# Patient Record
Sex: FEMALE | Race: WHITE | NOT HISPANIC OR LATINO | Employment: FULL TIME | URBAN - METROPOLITAN AREA
[De-identification: names, ages, dates, MRNs, and addresses within clinical notes are randomized per-mention and may not be internally consistent; named-entity substitution may affect disease eponyms.]

---

## 2022-03-11 ENCOUNTER — HISTORICAL (OUTPATIENT)
Dept: ADMINISTRATIVE | Facility: HOSPITAL | Age: 32
End: 2022-03-11

## 2022-04-10 ENCOUNTER — HISTORICAL (OUTPATIENT)
Dept: ADMINISTRATIVE | Facility: HOSPITAL | Age: 32
End: 2022-04-10

## 2022-04-26 VITALS
BODY MASS INDEX: 20.61 KG/M2 | WEIGHT: 112 LBS | SYSTOLIC BLOOD PRESSURE: 120 MMHG | HEIGHT: 62 IN | OXYGEN SATURATION: 98 % | DIASTOLIC BLOOD PRESSURE: 80 MMHG

## 2022-05-14 NOTE — OP NOTE
DATE OF SURGERY:    03/11/2022    SURGEON:  Luc Ramon MD    PREOPERATIVE DIAGNOSIS:  Rectal bleeding.    POSTOPERATIVE DIAGNOSES:    1. Mild proctitis.  2. Hemorrhoids.    PROCEDURE:  Colonoscopy to the cecum.    ANESTHESIA:  Propofol.    ESTIMATED BLOOD LOSS:  Less than 10 cc.    SPECIMENS:  None.    COMPLICATIONS:  None.    PROCEDURE IN DETAIL:  After informed consent was obtained, patient was brought to the operating room.  Continuous EKG, pulse oximetry, and intermittent blood pressure monitoring were utilized.  The patient was placed in the left lateral decubitus position and propofol was administered by a member of the anesthesia team.  A perianal examination was unremarkable.  Digital rectal exam was normal.       Colonoscope was inserted into the rectum and insufflated.  Mild proctitis was identified involving the lower rectum.  Colonoscope was then advanced in a retrograde fashion to the level of the cecum.  The colon was quite tortuous.  The ileocecal valve and appendiceal orifice were identified.  Photos were taken.  The terminal ileum was intubated and normal.  A photo was taken.  Colonoscope was retracted back into the cecum, and the colonic mucosa was examined in a 360-degree fashion.  No masses, polyps, or other mucosal abnormalities were noted.  Colonoscope was retracted back into the rectum while decompressing the colon.  It was retroflexed, and mild internal hemorrhoids were seen.  A photo was taken.  Colonoscope was returned to the neutral position and completely removed from the patient.  She tolerated procedure well, and there were no complications.  Subsequently, she was transferred to recovery in satisfactory condition.      PLAN:  Recommend resuming screening at age 46.        ______________________________  Luc Ramon MD    /AMANDO  DD:  03/11/2022  Time:  05:12PM  DT:  03/11/2022  Time:  08:57PM  Job #:  [id number]

## 2022-06-01 PROBLEM — F98.8 ATTENTION DEFICIT DISORDER: Status: ACTIVE | Noted: 2022-06-01

## 2023-01-06 ENCOUNTER — LAB VISIT (OUTPATIENT)
Dept: LAB | Facility: HOSPITAL | Age: 33
End: 2023-01-06
Attending: STUDENT IN AN ORGANIZED HEALTH CARE EDUCATION/TRAINING PROGRAM
Payer: COMMERCIAL

## 2023-01-06 DIAGNOSIS — R15.2 FECAL URGENCY: ICD-10-CM

## 2023-01-06 DIAGNOSIS — R19.8 TENESMUS (RECTAL): ICD-10-CM

## 2023-01-06 DIAGNOSIS — K62.5 RECTAL BLEEDING: Primary | ICD-10-CM

## 2023-01-06 LAB
ANION GAP SERPL CALC-SCNC: 7 MEQ/L
BASOPHILS # BLD AUTO: 0.05 X10(3)/MCL (ref 0–0.2)
BASOPHILS NFR BLD AUTO: 0.8 %
BUN SERPL-MCNC: 9.5 MG/DL (ref 7–18.7)
CALCIUM SERPL-MCNC: 9.8 MG/DL (ref 8.4–10.2)
CHLORIDE SERPL-SCNC: 106 MMOL/L (ref 98–107)
CO2 SERPL-SCNC: 27 MMOL/L (ref 22–29)
CREAT SERPL-MCNC: 0.95 MG/DL (ref 0.55–1.02)
CREAT/UREA NIT SERPL: 10
CRP SERPL-MCNC: 0.6 MG/L
DEPRECATED CALCIDIOL+CALCIFEROL SERPL-MC: 44.7 NG/ML (ref 30–80)
EOSINOPHIL # BLD AUTO: 0.12 X10(3)/MCL (ref 0–0.9)
EOSINOPHIL NFR BLD AUTO: 1.9 %
ERYTHROCYTE [DISTWIDTH] IN BLOOD BY AUTOMATED COUNT: 12.8 % (ref 11–14.5)
FERRITIN SERPL-MCNC: 63.62 NG/ML (ref 4.63–204)
GFR SERPLBLD CREATININE-BSD FMLA CKD-EPI: 82 MLS/MIN/1.73/M2
GLUCOSE SERPL-MCNC: 80 MG/DL (ref 74–100)
HCT VFR BLD AUTO: 40 % (ref 37–47)
HGB BLD-MCNC: 13.5 GM/DL (ref 12–16)
IMM GRANULOCYTES # BLD AUTO: 0.01 X10(3)/MCL (ref 0–0.04)
IMM GRANULOCYTES NFR BLD AUTO: 0.2 %
IRON SATN MFR SERPL: 44 % (ref 20–50)
IRON SERPL-MCNC: 136 UG/DL (ref 50–170)
LYMPHOCYTES # BLD AUTO: 1.51 X10(3)/MCL (ref 0.6–4.6)
LYMPHOCYTES NFR BLD AUTO: 23.6 %
MCH RBC QN AUTO: 30.8 PG
MCHC RBC AUTO-ENTMCNC: 33.8 MG/DL (ref 33–36)
MCV RBC AUTO: 91.1 FL (ref 80–94)
MONOCYTES # BLD AUTO: 0.56 X10(3)/MCL (ref 0.1–1.3)
MONOCYTES NFR BLD AUTO: 8.8 %
NEUTROPHILS # BLD AUTO: 4.14 X10(3)/MCL (ref 2.1–9.2)
NEUTROPHILS NFR BLD AUTO: 64.7 %
NRBC BLD AUTO-RTO: 0 % (ref 0–1)
PLATELET # BLD AUTO: 276 X10(3)/MCL (ref 140–371)
PMV BLD AUTO: 10.8 FL (ref 9.4–12.4)
POTASSIUM SERPL-SCNC: 4.4 MMOL/L (ref 3.5–5.1)
RBC # BLD AUTO: 4.39 X10(6)/MCL (ref 4.2–5.4)
SODIUM SERPL-SCNC: 140 MMOL/L (ref 136–145)
TIBC SERPL-MCNC: 175 UG/DL (ref 70–310)
TIBC SERPL-MCNC: 311 UG/DL (ref 250–450)
WBC # SPEC AUTO: 6.4 X10(3)/MCL (ref 4.5–11.5)

## 2023-01-06 PROCEDURE — 82728 ASSAY OF FERRITIN: CPT

## 2023-01-06 PROCEDURE — 85025 COMPLETE CBC W/AUTO DIFF WBC: CPT

## 2023-01-06 PROCEDURE — 80048 BASIC METABOLIC PNL TOTAL CA: CPT

## 2023-01-06 PROCEDURE — 82306 VITAMIN D 25 HYDROXY: CPT

## 2023-01-06 PROCEDURE — 36415 COLL VENOUS BLD VENIPUNCTURE: CPT

## 2023-01-06 PROCEDURE — 86140 C-REACTIVE PROTEIN: CPT

## 2023-01-06 PROCEDURE — 83550 IRON BINDING TEST: CPT

## 2023-05-18 ENCOUNTER — LAB VISIT (OUTPATIENT)
Dept: LAB | Facility: HOSPITAL | Age: 33
End: 2023-05-18
Attending: OBSTETRICS & GYNECOLOGY
Payer: COMMERCIAL

## 2023-05-18 DIAGNOSIS — E28.2 POLYCYSTIC OVARIES: Primary | ICD-10-CM

## 2023-05-18 LAB
FSH SERPL-ACNC: 3.01 MIU/ML
PROLACTIN LEVEL (OHS): 5.93 NG/ML (ref 5.18–26.53)
TSH SERPL-ACNC: 1.87 UIU/ML (ref 0.35–4.94)

## 2023-05-18 PROCEDURE — 36415 COLL VENOUS BLD VENIPUNCTURE: CPT

## 2023-05-18 PROCEDURE — 84443 ASSAY THYROID STIM HORMONE: CPT

## 2023-05-18 PROCEDURE — 84146 ASSAY OF PROLACTIN: CPT

## 2023-05-18 PROCEDURE — 83001 ASSAY OF GONADOTROPIN (FSH): CPT

## 2023-06-07 ENCOUNTER — LAB VISIT (OUTPATIENT)
Dept: LAB | Facility: HOSPITAL | Age: 33
End: 2023-06-07
Attending: STUDENT IN AN ORGANIZED HEALTH CARE EDUCATION/TRAINING PROGRAM
Payer: COMMERCIAL

## 2023-06-07 DIAGNOSIS — R19.8 TENESMUS: ICD-10-CM

## 2023-06-07 DIAGNOSIS — E28.2 POLYCYSTIC OVARIES: ICD-10-CM

## 2023-06-07 DIAGNOSIS — K62.5 HEMORRHAGE OF RECTUM AND ANUS: ICD-10-CM

## 2023-06-07 DIAGNOSIS — K51.20 ULCERATIVE (CHRONIC) PROCTITIS: Primary | ICD-10-CM

## 2023-06-07 LAB — PROGEST SERPL-MCNC: <0.5 NG/ML

## 2023-06-07 PROCEDURE — 84144 ASSAY OF PROGESTERONE: CPT

## 2023-06-07 PROCEDURE — 36415 COLL VENOUS BLD VENIPUNCTURE: CPT

## 2023-06-07 PROCEDURE — 83993 ASSAY FOR CALPROTECTIN FECAL: CPT

## 2023-06-09 LAB — CALPROTECTIN STL-MCNT: <50 MCG/G

## 2023-07-21 ENCOUNTER — LAB VISIT (OUTPATIENT)
Dept: LAB | Facility: HOSPITAL | Age: 33
End: 2023-07-21
Attending: OBSTETRICS & GYNECOLOGY
Payer: COMMERCIAL

## 2023-07-21 DIAGNOSIS — E28.2 POLYCYSTIC OVARIES: Primary | ICD-10-CM

## 2023-07-21 LAB — PROGEST SERPL-MCNC: 0.5 NG/ML

## 2023-07-21 PROCEDURE — 36415 COLL VENOUS BLD VENIPUNCTURE: CPT

## 2023-07-21 PROCEDURE — 84144 ASSAY OF PROGESTERONE: CPT

## 2023-09-06 ENCOUNTER — LAB VISIT (OUTPATIENT)
Dept: LAB | Facility: HOSPITAL | Age: 33
End: 2023-09-06
Attending: OBSTETRICS & GYNECOLOGY
Payer: COMMERCIAL

## 2023-09-06 DIAGNOSIS — E34.9 ENDOCRINE DISORDER RELATED TO PUBERTY: Primary | ICD-10-CM

## 2023-09-06 LAB — PROGEST SERPL-MCNC: 2.4 NG/ML

## 2023-09-06 PROCEDURE — 84144 ASSAY OF PROGESTERONE: CPT

## 2023-09-06 PROCEDURE — 36415 COLL VENOUS BLD VENIPUNCTURE: CPT

## 2023-10-02 ENCOUNTER — LAB VISIT (OUTPATIENT)
Dept: LAB | Facility: HOSPITAL | Age: 33
End: 2023-10-02
Attending: OBSTETRICS & GYNECOLOGY
Payer: COMMERCIAL

## 2023-10-02 DIAGNOSIS — E34.9 ENDOCRINE DISORDER RELATED TO PUBERTY: Primary | ICD-10-CM

## 2023-10-02 LAB — PROGEST SERPL-MCNC: 5.7 NG/ML

## 2023-10-02 PROCEDURE — 84144 ASSAY OF PROGESTERONE: CPT

## 2023-10-02 PROCEDURE — 36415 COLL VENOUS BLD VENIPUNCTURE: CPT

## 2023-11-15 PROBLEM — Z23 IMMUNIZATION DUE: Status: ACTIVE | Noted: 2023-11-15

## 2024-01-12 ENCOUNTER — LAB VISIT (OUTPATIENT)
Dept: LAB | Facility: HOSPITAL | Age: 34
End: 2024-01-12
Attending: STUDENT IN AN ORGANIZED HEALTH CARE EDUCATION/TRAINING PROGRAM
Payer: COMMERCIAL

## 2024-01-12 DIAGNOSIS — K51.20 ULCERATIVE (CHRONIC) PROCTITIS: Primary | ICD-10-CM

## 2024-01-12 LAB
ALBUMIN SERPL-MCNC: 3.7 G/DL (ref 3.5–5)
ALBUMIN/GLOB SERPL: 1.3 RATIO (ref 1.1–2)
ALP SERPL-CCNC: 37 UNIT/L (ref 40–150)
ALT SERPL-CCNC: 13 UNIT/L (ref 0–55)
AST SERPL-CCNC: 18 UNIT/L (ref 5–34)
BASOPHILS # BLD AUTO: 0.03 X10(3)/MCL
BASOPHILS NFR BLD AUTO: 0.6 %
BILIRUB SERPL-MCNC: 0.3 MG/DL
BUN SERPL-MCNC: 10.5 MG/DL (ref 7–18.7)
CALCIUM SERPL-MCNC: 9.4 MG/DL (ref 8.4–10.2)
CHLORIDE SERPL-SCNC: 107 MMOL/L (ref 98–107)
CO2 SERPL-SCNC: 26 MMOL/L (ref 22–29)
CREAT SERPL-MCNC: 0.75 MG/DL (ref 0.55–1.02)
EOSINOPHIL # BLD AUTO: 0.07 X10(3)/MCL (ref 0–0.9)
EOSINOPHIL NFR BLD AUTO: 1.4 %
ERYTHROCYTE [DISTWIDTH] IN BLOOD BY AUTOMATED COUNT: 12.8 % (ref 11.5–17)
GFR SERPLBLD CREATININE-BSD FMLA CKD-EPI: >60 MLS/MIN/1.73/M2
GLOBULIN SER-MCNC: 2.8 GM/DL (ref 2.4–3.5)
GLUCOSE SERPL-MCNC: 62 MG/DL (ref 74–100)
HCT VFR BLD AUTO: 38.1 % (ref 37–47)
HGB BLD-MCNC: 12.8 G/DL (ref 12–16)
IMM GRANULOCYTES # BLD AUTO: 0.01 X10(3)/MCL (ref 0–0.04)
IMM GRANULOCYTES NFR BLD AUTO: 0.2 %
LYMPHOCYTES # BLD AUTO: 1.57 X10(3)/MCL (ref 0.6–4.6)
LYMPHOCYTES NFR BLD AUTO: 31.1 %
MCH RBC QN AUTO: 31.1 PG (ref 27–31)
MCHC RBC AUTO-ENTMCNC: 33.6 G/DL (ref 33–36)
MCV RBC AUTO: 92.5 FL (ref 80–94)
MONOCYTES # BLD AUTO: 0.36 X10(3)/MCL (ref 0.1–1.3)
MONOCYTES NFR BLD AUTO: 7.1 %
NEUTROPHILS # BLD AUTO: 3.01 X10(3)/MCL (ref 2.1–9.2)
NEUTROPHILS NFR BLD AUTO: 59.6 %
NRBC BLD AUTO-RTO: 0 %
PLATELET # BLD AUTO: 236 X10(3)/MCL (ref 130–400)
PMV BLD AUTO: 10.8 FL (ref 7.4–10.4)
POTASSIUM SERPL-SCNC: 4.4 MMOL/L (ref 3.5–5.1)
PROT SERPL-MCNC: 6.5 GM/DL (ref 6.4–8.3)
RBC # BLD AUTO: 4.12 X10(6)/MCL (ref 4.2–5.4)
SODIUM SERPL-SCNC: 139 MMOL/L (ref 136–145)
WBC # SPEC AUTO: 5.05 X10(3)/MCL (ref 4.5–11.5)

## 2024-01-12 PROCEDURE — 36415 COLL VENOUS BLD VENIPUNCTURE: CPT

## 2024-01-12 PROCEDURE — 80053 COMPREHEN METABOLIC PANEL: CPT

## 2024-01-12 PROCEDURE — 85025 COMPLETE CBC W/AUTO DIFF WBC: CPT

## 2024-01-24 ENCOUNTER — LAB REQUISITION (OUTPATIENT)
Dept: LAB | Facility: HOSPITAL | Age: 34
End: 2024-01-24
Payer: COMMERCIAL

## 2024-01-24 DIAGNOSIS — K51.20 ULCERATIVE (CHRONIC) PROCTITIS WITHOUT COMPLICATIONS: ICD-10-CM

## 2024-01-24 PROCEDURE — 83993 ASSAY FOR CALPROTECTIN FECAL: CPT | Performed by: STUDENT IN AN ORGANIZED HEALTH CARE EDUCATION/TRAINING PROGRAM

## 2024-01-26 LAB — CALPROTECTIN STL-MCNT: <50 MCG/G

## 2024-02-20 NOTE — PROGRESS NOTES
.Medication Refill (3 month f/u/No complaints at present )         HPI:    Patient presents for 3 month recheck/refill medications.  Patient states medications are working well; she is able to concentrate, focus and stay on task.  She denies anxiety, insomnia, palpitations, unintentional weight loss, headaches or dry mouth.   reviewed.    Narcotics agreement updated 05/31/2023.      Current Outpatient Medications:     CLOMID 50 mg tablet, PRN, Disp: , Rfl:     mesalamine (LIALDA) 1.2 gram TbEC, Take 2.4 g by mouth., Disp: , Rfl:     metFORMIN (GLUCOPHAGE) 850 MG tablet, Take 850 mg by mouth 2 (two) times daily., Disp: , Rfl:     nifedipine 0.3%-lidocaine 1.5% topical ointment, Apply topically 3 (three) times daily. Apply a pea sized amount three times a day to the anus., Disp: , Rfl:     ondansetron (ZOFRAN) 8 MG tablet, Take 8 mg by mouth every 8 (eight) hours as needed for Nausea., Disp: , Rfl:     prenat.vits,dana,min-iron-folic Tab, Take by mouth Daily., Disp: , Rfl:     progesterone (PROMETRIUM) 200 MG capsule, Take 200 mg by mouth every evening., Disp: , Rfl:     tretinoin (RETIN-A) 0.05 % cream, Apply topically every evening., Disp: , Rfl:     valACYclovir (VALTREX) 500 MG tablet, Take 500 mg by mouth as needed., Disp: , Rfl:     dextroamphetamine-amphetamine (ADDERALL) 20 mg tablet, Take 1 tablet by mouth 2 (two) times a day., Disp: 60 tablet, Rfl: 0    dextroamphetamine-amphetamine (ADDERALL) 20 mg tablet, Take 1 tablet by mouth 2 (two) times a day. Fill: 3/20/2024., Disp: 60 tablet, Rfl: 0    dextroamphetamine-amphetamine (ADDERALL) 20 mg tablet, Take 1 tablet by mouth 2 (two) times a day. Fill: 4/19/2024., Disp: 60 tablet, Rfl: 0      ROS:    See HPI.      PE:    ..  Vitals:    02/21/24 1329   BP: 98/76   Pulse: 96   Resp: 18        General:  She is well-developed well-nourished white female in no apparent distress.  She is alert and oriented.    Chest: Clear to auscultation bilaterally.    CV:  Regular  rate rhythm without murmurs rubs or gallops.        1. Attention deficit disorder (ADD) without hyperactivity  Overview:  Stable/patient doing well on current treatment, will continue to closely monitor, follow-up in 3 months.  3 prescriptions sent to pharm at office visit today for 3-month supply.  Risks/benefits/side effects of medication discussed with patient.  Patient denies any insomnia or palpitations.   Informed patient to stop Adderall immediatly if she misses her period or has +UPT--verbalizes understanding.    05/31/2023: Patient continues to do well on medications; refills x3 given.  Patient is actively trying to become pregnant; see above.  Narcotics agreement updated.    08/22/2023:  Patient is doing well on medications; refills x3 given.    11/15/2023: Patient is doing well on medications; refills x3 given.    02/21/2024:  Patient is doing well on medications; refills x3 given.    Orders:  -     dextroamphetamine-amphetamine (ADDERALL) 20 mg tablet; Take 1 tablet by mouth 2 (two) times a day.  Dispense: 60 tablet; Refill: 0  -     dextroamphetamine-amphetamine (ADDERALL) 20 mg tablet; Take 1 tablet by mouth 2 (two) times a day. Fill: 3/20/2024.  Dispense: 60 tablet; Refill: 0  -     dextroamphetamine-amphetamine (ADDERALL) 20 mg tablet; Take 1 tablet by mouth 2 (two) times a day. Fill: 4/19/2024.  Dispense: 60 tablet; Refill: 0    2. Ulcerative (chronic) proctitis without complications  Overview:  Stable; patient is on Lialda.    Followed by Dr. Sundar De Leon.                ..Follow up in about 3 months (around 5/21/2024) for Recheck meds.

## 2024-02-21 ENCOUNTER — OFFICE VISIT (OUTPATIENT)
Dept: PRIMARY CARE CLINIC | Facility: CLINIC | Age: 34
End: 2024-02-21
Payer: COMMERCIAL

## 2024-02-21 VITALS
RESPIRATION RATE: 18 BRPM | DIASTOLIC BLOOD PRESSURE: 76 MMHG | SYSTOLIC BLOOD PRESSURE: 98 MMHG | HEART RATE: 96 BPM | BODY MASS INDEX: 21.16 KG/M2 | WEIGHT: 115 LBS | HEIGHT: 62 IN | OXYGEN SATURATION: 100 %

## 2024-02-21 DIAGNOSIS — F98.8 ATTENTION DEFICIT DISORDER (ADD) WITHOUT HYPERACTIVITY: Primary | ICD-10-CM

## 2024-02-21 DIAGNOSIS — K51.20 ULCERATIVE (CHRONIC) PROCTITIS WITHOUT COMPLICATIONS: ICD-10-CM

## 2024-02-21 PROCEDURE — 3008F BODY MASS INDEX DOCD: CPT | Mod: CPTII,,, | Performed by: FAMILY MEDICINE

## 2024-02-21 PROCEDURE — 1159F MED LIST DOCD IN RCRD: CPT | Mod: CPTII,,, | Performed by: FAMILY MEDICINE

## 2024-02-21 PROCEDURE — 3074F SYST BP LT 130 MM HG: CPT | Mod: CPTII,,, | Performed by: FAMILY MEDICINE

## 2024-02-21 PROCEDURE — 99213 OFFICE O/P EST LOW 20 MIN: CPT | Mod: ,,, | Performed by: FAMILY MEDICINE

## 2024-02-21 PROCEDURE — 3078F DIAST BP <80 MM HG: CPT | Mod: CPTII,,, | Performed by: FAMILY MEDICINE

## 2024-02-21 RX ORDER — PROGESTERONE 200 MG/1
200 CAPSULE ORAL NIGHTLY
COMMUNITY
Start: 2024-01-22 | End: 2024-05-15

## 2024-02-21 RX ORDER — DEXTROAMPHETAMINE SACCHARATE, AMPHETAMINE ASPARTATE, DEXTROAMPHETAMINE SULFATE AND AMPHETAMINE SULFATE 5; 5; 5; 5 MG/1; MG/1; MG/1; MG/1
1 TABLET ORAL 2 TIMES DAILY
Qty: 60 TABLET | Refills: 0 | Status: SHIPPED | OUTPATIENT
Start: 2024-02-21 | End: 2024-03-22

## 2024-02-21 RX ORDER — DEXTROAMPHETAMINE SACCHARATE, AMPHETAMINE ASPARTATE, DEXTROAMPHETAMINE SULFATE AND AMPHETAMINE SULFATE 5; 5; 5; 5 MG/1; MG/1; MG/1; MG/1
1 TABLET ORAL 2 TIMES DAILY
Qty: 60 TABLET | Refills: 0 | Status: SHIPPED | OUTPATIENT
Start: 2024-02-21 | End: 2024-05-15 | Stop reason: SDUPTHER

## 2024-02-21 RX ORDER — METFORMIN HYDROCHLORIDE 850 MG/1
850 TABLET ORAL 2 TIMES DAILY
COMMUNITY
Start: 2024-02-19

## 2024-05-11 NOTE — PROGRESS NOTES
"Medication Refill       HPI:    Patient presents for 3 month recheck/refill medications.  Patient states medications are working well; she is able to concentrate, focus and stay on task.  She denies anxiety, insomnia, palpitations, unintentional weight loss, headaches or dry mouth.   reviewed.    Narcotics agreement updated 05/15/2024.      Current Outpatient Medications   Medication Instructions    cefadroxil (DURICEF) 500 mg, Oral, 2 times daily    dextroamphetamine-amphetamine (ADDERALL) 20 mg tablet 1 tablet, Oral, 2 times daily    dextroamphetamine-amphetamine (ADDERALL) 20 mg tablet 1 tablet, Oral, 2 times daily, Fill: 6/14/2024.    dextroamphetamine-amphetamine (ADDERALL) 20 mg tablet 1 tablet, Oral, 2 times daily, Fill: 7/15/2024    medroxyPROGESTERone (PROVERA) 10 mg, Oral    mesalamine (LIALDA) 2.4 g, Oral    metFORMIN (GLUCOPHAGE) 850 mg, Oral, 2 times daily    nifedipine 0.3%-lidocaine 1.5% topical ointment Topical (Top), 3 times daily, Apply a pea sized amount three times a day to the anus.    NOVAREL 5,000 unit SolR Subcutaneous    ondansetron (ZOFRAN) 8 mg, Oral, Every 8 hours PRN    prenat.vits,dana,min-iron-folic Tab Oral, Daily    tretinoin (RETIN-A) 0.05 % cream Topical (Top), Nightly    valACYclovir (VALTREX) 500 mg, Oral, As needed (PRN)         ROS:    She has been feeling okay; she is currently undergoing IVF treatment.  She has been sleeping well.    Her appetite has been good.      PE:    ..Visit Vitals  /81   Pulse 100   Temp 98.2 °F (36.8 °C)   Ht 5' 2" (1.575 m)   Wt 51.8 kg (114 lb 4.8 oz)   LMP 05/05/2024 (Exact Date)   SpO2 100%   BMI 20.91 kg/m²        General:  She is a well-developed well-nourished white female in no apparent distress.  She is alert and oriented.    Chest: Clear to auscultation bilaterally.    CV:  Regular rate rhythm without murmurs rubs or gallops        1. Attention deficit disorder (ADD) without hyperactivity  Overview:  Stable/patient doing well on " current treatment, will continue to closely monitor, follow-up in 3 months.  3 prescriptions sent to pharm at office visit today for 3-month supply.  Risks/benefits/side effects of medication discussed with patient.  Patient denies any insomnia or palpitations.   Informed patient to stop Adderall immediatly if she misses her period or has +UPT--verbalizes understanding.    05/31/2023: Patient continues to do well on medications; refills x3 given.  Patient is actively trying to become pregnant; see above.  Narcotics agreement updated.    08/22/2023:  Patient is doing well on medications; refills x3 given.    11/15/2023: Patient is doing well on medications; refills x3 given.    02/21/2024:  Patient is doing well on medications; refills x3 given.    05/15/2024: Patient is doing well on medications; refills x3 given.    Narcotics agreement updated.    Orders:  -     dextroamphetamine-amphetamine (ADDERALL) 20 mg tablet; Take 1 tablet by mouth 2 (two) times a day.  Dispense: 60 tablet; Refill: 0    Other orders  -     dextroamphetamine-amphetamine (ADDERALL) 20 mg tablet; Take 1 tablet by mouth 2 (two) times a day. Fill: 6/14/2024.  Dispense: 60 tablet; Refill: 0  -     dextroamphetamine-amphetamine (ADDERALL) 20 mg tablet; Take 1 tablet by mouth 2 (two) times a day. Fill: 7/15/2024  Dispense: 60 tablet; Refill: 0              ..Follow up in about 3 months (around 8/15/2024) for ADD Follow Up.       Future Appointments   Date Time Provider Department Center   8/21/2024  1:15 PM [facility]

## 2024-05-15 ENCOUNTER — OFFICE VISIT (OUTPATIENT)
Dept: PRIMARY CARE CLINIC | Facility: CLINIC | Age: 34
End: 2024-05-15
Payer: COMMERCIAL

## 2024-05-15 VITALS
OXYGEN SATURATION: 100 % | SYSTOLIC BLOOD PRESSURE: 119 MMHG | WEIGHT: 114.31 LBS | TEMPERATURE: 98 F | HEIGHT: 62 IN | DIASTOLIC BLOOD PRESSURE: 81 MMHG | HEART RATE: 100 BPM | BODY MASS INDEX: 21.04 KG/M2

## 2024-05-15 DIAGNOSIS — F98.8 ATTENTION DEFICIT DISORDER (ADD) WITHOUT HYPERACTIVITY: Primary | ICD-10-CM

## 2024-05-15 PROCEDURE — 1160F RVW MEDS BY RX/DR IN RCRD: CPT | Mod: CPTII,,, | Performed by: FAMILY MEDICINE

## 2024-05-15 PROCEDURE — 3079F DIAST BP 80-89 MM HG: CPT | Mod: CPTII,,, | Performed by: FAMILY MEDICINE

## 2024-05-15 PROCEDURE — 3074F SYST BP LT 130 MM HG: CPT | Mod: CPTII,,, | Performed by: FAMILY MEDICINE

## 2024-05-15 PROCEDURE — 1159F MED LIST DOCD IN RCRD: CPT | Mod: CPTII,,, | Performed by: FAMILY MEDICINE

## 2024-05-15 PROCEDURE — 3008F BODY MASS INDEX DOCD: CPT | Mod: CPTII,,, | Performed by: FAMILY MEDICINE

## 2024-05-15 PROCEDURE — 99213 OFFICE O/P EST LOW 20 MIN: CPT | Mod: ,,, | Performed by: FAMILY MEDICINE

## 2024-05-15 RX ORDER — GONADOTROPHIN, CHORIONIC 5000 UNIT
KIT INTRAMUSCULAR
COMMUNITY
Start: 2024-02-23

## 2024-05-15 RX ORDER — DEXTROAMPHETAMINE SACCHARATE, AMPHETAMINE ASPARTATE, DEXTROAMPHETAMINE SULFATE AND AMPHETAMINE SULFATE 5; 5; 5; 5 MG/1; MG/1; MG/1; MG/1
1 TABLET ORAL 2 TIMES DAILY
Qty: 60 TABLET | Refills: 0 | Status: SHIPPED | OUTPATIENT
Start: 2024-05-15 | End: 2024-06-14

## 2024-05-15 RX ORDER — CEFADROXIL 500 MG/1
500 CAPSULE ORAL 2 TIMES DAILY
COMMUNITY
Start: 2024-04-19

## 2024-05-15 RX ORDER — MEDROXYPROGESTERONE ACETATE 10 MG/1
10 TABLET ORAL
COMMUNITY
Start: 2024-04-19

## 2024-08-18 NOTE — PROGRESS NOTES
"Medication Refill (3 month med refill)       HPI:    Patient presents for 3 month recheck/refill medications.  Patient states medications are working well; she is able to concentrate, focus and stay on task.  She denies anxiety, insomnia, palpitations, unintentional weight loss, headaches or dry mouth.   reviewed.    Narcotics agreement updated 05/15/2024      Current Outpatient Medications   Medication Instructions    cefadroxil (DURICEF) 500 mg, Oral, 2 times daily    dextroamphetamine-amphetamine (ADDERALL) 20 mg tablet 1 tablet, Oral, 2 times daily    dextroamphetamine-amphetamine (ADDERALL) 20 mg tablet 1 tablet, Oral, 2 times daily, Fill: 9/20/2024.    dextroamphetamine-amphetamine (ADDERALL) 20 mg tablet Fill: 10/21/2024.    medroxyPROGESTERone (PROVERA) 10 mg    mesalamine (LIALDA) 2.4 g, Oral    metFORMIN (GLUCOPHAGE) 850 mg, Oral, 2 times daily    nifedipine 0.3%-lidocaine 1.5% topical ointment Topical (Top), 3 times daily, Apply a pea sized amount three times a day to the anus.    NOVAREL 5,000 unit SolR Inject into the skin.    ondansetron (ZOFRAN) 8 mg, Oral, Every 8 hours PRN    prenat.vits,dana,min-iron-folic Tab Oral, Daily    tretinoin (RETIN-A) 0.05 % cream Topical (Top), Nightly    valACYclovir (VALTREX) 500 mg, Oral, As needed (PRN)         ROS:    She has been feeling okay; she is currently undergoing IVF treatment.  She has been sleeping well.    Her appetite has been good.  She has gained 15 lb secondary to fertility treatment.    PE:    ..Visit Vitals  /71   Pulse 82   Temp 98.7 °F (37.1 °C)   Ht 5' 2" (1.575 m)   Wt 56.9 kg (125 lb 6.4 oz)   LMP 08/20/2024 (Exact Date)   SpO2 97%   BMI 22.94 kg/m²        General:  She is a well-developed well-nourished white female in no apparent distress.  She is alert and oriented.    Chest: Clear to auscultation bilaterally.    CV:  Regular rate rhythm without murmurs rubs or gallops        1. Attention deficit disorder (ADD) without " hyperactivity  Overview:  Stable/patient doing well on current treatment, will continue to closely monitor, follow-up in 3 months.  3 prescriptions sent to pharm at office visit today for 3-month supply.  Risks/benefits/side effects of medication discussed with patient.  Patient denies any insomnia or palpitations.   Informed patient to stop Adderall immediatly if she misses her period or has +UPT--verbalizes understanding.    05/31/2023: Patient continues to do well on medications; refills x3 given.  Patient is actively trying to become pregnant; see above.  Narcotics agreement updated.    08/22/2023:  Patient is doing well on medications; refills x3 given.    11/15/2023: Patient is doing well on medications; refills x3 given.    02/21/2024:  Patient is doing well on medications; refills x3 given.    05/15/2024: Patient is doing well on medications; refills x3 given.    Narcotics agreement updated.    08/21/2024:  Patient is doing well on medications; refills x3 given.        Other orders  -     dextroamphetamine-amphetamine (ADDERALL) 20 mg tablet; Take 1 tablet by mouth 2 (two) times a day.  Dispense: 60 tablet; Refill: 0  -     dextroamphetamine-amphetamine (ADDERALL) 20 mg tablet; Take 1 tablet by mouth 2 (two) times a day. Fill: 9/20/2024.  Dispense: 60 tablet; Refill: 0  -     dextroamphetamine-amphetamine (ADDERALL) 20 mg tablet; Fill: 10/21/2024.  Dispense: 60 tablet; Refill: 0              ..Follow up in about 3 months (around 11/21/2024) for ADD Follow Up.       Future Appointments   Date Time Provider Department Center   11/20/2024  1:00 PM [facility]

## 2024-08-21 ENCOUNTER — OFFICE VISIT (OUTPATIENT)
Dept: PRIMARY CARE CLINIC | Facility: CLINIC | Age: 34
End: 2024-08-21
Payer: COMMERCIAL

## 2024-08-21 VITALS
HEIGHT: 62 IN | DIASTOLIC BLOOD PRESSURE: 71 MMHG | TEMPERATURE: 99 F | SYSTOLIC BLOOD PRESSURE: 111 MMHG | WEIGHT: 125.38 LBS | BODY MASS INDEX: 23.07 KG/M2 | HEART RATE: 82 BPM | OXYGEN SATURATION: 97 %

## 2024-08-21 DIAGNOSIS — F98.8 ATTENTION DEFICIT DISORDER (ADD) WITHOUT HYPERACTIVITY: Primary | ICD-10-CM

## 2024-08-21 RX ORDER — DEXTROAMPHETAMINE SACCHARATE, AMPHETAMINE ASPARTATE, DEXTROAMPHETAMINE SULFATE AND AMPHETAMINE SULFATE 5; 5; 5; 5 MG/1; MG/1; MG/1; MG/1
TABLET ORAL
Qty: 60 TABLET | Refills: 0 | Status: SHIPPED | OUTPATIENT
Start: 2024-08-21

## 2024-08-21 RX ORDER — DEXTROAMPHETAMINE SACCHARATE, AMPHETAMINE ASPARTATE, DEXTROAMPHETAMINE SULFATE AND AMPHETAMINE SULFATE 5; 5; 5; 5 MG/1; MG/1; MG/1; MG/1
1 TABLET ORAL 2 TIMES DAILY
Qty: 60 TABLET | Refills: 0 | Status: SHIPPED | OUTPATIENT
Start: 2024-08-21 | End: 2024-09-20

## 2024-10-22 ENCOUNTER — LAB VISIT (OUTPATIENT)
Dept: LAB | Facility: HOSPITAL | Age: 34
End: 2024-10-22
Attending: OBSTETRICS & GYNECOLOGY
Payer: COMMERCIAL

## 2024-10-22 DIAGNOSIS — Z34.01 ENCOUNTER FOR SUPERVISION OF NORMAL FIRST PREGNANCY IN FIRST TRIMESTER: Primary | ICD-10-CM

## 2024-10-22 LAB
C TRACH DNA SPEC QL NAA+PROBE: NEGATIVE
ERYTHROCYTE [DISTWIDTH] IN BLOOD BY AUTOMATED COUNT: 12.4 % (ref 11.5–17)
GROUP & RH: NORMAL
HCT VFR BLD AUTO: 39.9 % (ref 37–47)
HGB BLD-MCNC: 13.2 G/DL (ref 12–16)
INDIRECT COOMBS: NORMAL
MCH RBC QN AUTO: 30.3 PG (ref 27–31)
MCHC RBC AUTO-ENTMCNC: 33.1 G/DL (ref 33–36)
MCV RBC AUTO: 91.5 FL (ref 80–94)
N GONORRHOEA DNA SPEC QL NAA+PROBE: NEGATIVE
NRBC BLD AUTO-RTO: 0 %
PLATELET # BLD AUTO: 286 X10(3)/MCL (ref 130–400)
PMV BLD AUTO: 11.2 FL (ref 7.4–10.4)
RBC # BLD AUTO: 4.36 X10(6)/MCL (ref 4.2–5.4)
SPECIMEN OUTDATE: NORMAL
T PALLIDUM AB SER QL: NONREACTIVE
WBC # BLD AUTO: 8.94 X10(3)/MCL (ref 4.5–11.5)

## 2024-10-22 PROCEDURE — 86900 BLOOD TYPING SEROLOGIC ABO: CPT | Performed by: OBSTETRICS & GYNECOLOGY

## 2024-10-22 PROCEDURE — 85660 RBC SICKLE CELL TEST: CPT

## 2024-10-22 PROCEDURE — 36415 COLL VENOUS BLD VENIPUNCTURE: CPT

## 2024-10-22 PROCEDURE — 86901 BLOOD TYPING SEROLOGIC RH(D): CPT | Performed by: OBSTETRICS & GYNECOLOGY

## 2024-10-22 PROCEDURE — 87340 HEPATITIS B SURFACE AG IA: CPT

## 2024-10-22 PROCEDURE — 87389 HIV-1 AG W/HIV-1&-2 AB AG IA: CPT

## 2024-10-22 PROCEDURE — 85027 COMPLETE CBC AUTOMATED: CPT

## 2024-10-22 PROCEDURE — 87086 URINE CULTURE/COLONY COUNT: CPT

## 2024-10-22 PROCEDURE — 86780 TREPONEMA PALLIDUM: CPT

## 2024-10-22 PROCEDURE — 86762 RUBELLA ANTIBODY: CPT

## 2024-10-23 LAB
HBV SURFACE AG SERPL QL IA: NONREACTIVE
HGB S BLD QL SOLY: NEGATIVE
HIV 1+2 AB+HIV1 P24 AG SERPL QL IA: NONREACTIVE

## 2024-10-24 LAB
BACTERIA UR CULT: NORMAL
RUBV IGG SERPL IA-ACNC: 0.5
RUBV IGG SERPL QL IA: NEGATIVE

## 2024-11-19 ENCOUNTER — TELEPHONE (OUTPATIENT)
Dept: PRIMARY CARE CLINIC | Facility: CLINIC | Age: 34
End: 2024-11-19
Payer: COMMERCIAL

## 2024-11-19 DIAGNOSIS — F98.8 ATTENTION DEFICIT DISORDER (ADD) WITHOUT HYPERACTIVITY: Primary | ICD-10-CM

## 2024-11-19 RX ORDER — DEXTROAMPHETAMINE SACCHARATE, AMPHETAMINE ASPARTATE, DEXTROAMPHETAMINE SULFATE AND AMPHETAMINE SULFATE 5; 5; 5; 5 MG/1; MG/1; MG/1; MG/1
TABLET ORAL
Qty: 60 TABLET | Refills: 0 | Status: SHIPPED | OUTPATIENT
Start: 2024-11-19

## 2024-11-19 NOTE — TELEPHONE ENCOUNTER
----- Message from Mago sent at 11/19/2024  2:16 PM CST -----  .Who Called: [facility]    Caller is requesting assistance/information from provider's office.    Symptoms (please be specific): n/a   How long has patient had these symptoms:  n/a    List of preferred pharmacies on file (remove unneeded): n/a    Preferred Method of Contact: Phone Call    Patient's Preferred Phone Number on File: [phone]     Best Call Back Number, if different: [phone] (TabCyprotexpapos)    Additional Information: Needing clarification on pt's   dextroamphetamine-amphetamine (ADDERALL) 20 mg tablet. Please advise, thank you.

## 2024-11-19 NOTE — TELEPHONE ENCOUNTER
Pharmacy requesting clarification on dispensing Adderall Rx. Needs frequency, dose and quantity clarification. Lalo Mullerdor

## 2024-11-19 NOTE — TELEPHONE ENCOUNTER
----- Message from Avery sent at 11/19/2024 12:12 PM CST -----  Who Called: Renee Arroyo [facility]    Caller is requesting assistance/information from provider's office.    Symptoms (please be specific):    How long has patient had these symptoms:    List of preferred pharmacies on file (remove unneeded): [facility] [address]  If different, enter pharmacy into here including location and phone number:       Preferred Method of Contact: Phone Call  Patient's Preferred Phone Number on File: [phone]   Best Call Back Number, if different:  Additional Information: Pt is requesting a cb in regards to her medication dextroamphetamine-amphetamine (ADDERALL) 20 mg tablet States she gets a printed script, states pharmacy tried to give a call yesterday trying to request refill.

## 2025-02-21 ENCOUNTER — LAB VISIT (OUTPATIENT)
Dept: LAB | Facility: HOSPITAL | Age: 35
End: 2025-02-21
Attending: OBSTETRICS & GYNECOLOGY
Payer: COMMERCIAL

## 2025-02-21 DIAGNOSIS — Z34.80 PRENATAL CARE, SUBSEQUENT PREGNANCY: Primary | ICD-10-CM

## 2025-02-21 LAB
ERYTHROCYTE [DISTWIDTH] IN BLOOD BY AUTOMATED COUNT: 13.4 % (ref 11.5–17)
GLUCOSE 1H P 100 G GLC PO SERPL-MCNC: 102 MG/DL (ref 100–180)
HCT VFR BLD AUTO: 35.2 % (ref 37–47)
HGB BLD-MCNC: 11.6 G/DL (ref 12–16)
MCH RBC QN AUTO: 30.6 PG (ref 27–31)
MCHC RBC AUTO-ENTMCNC: 33 G/DL (ref 33–36)
MCV RBC AUTO: 92.9 FL (ref 80–94)
NRBC BLD AUTO-RTO: 0 %
PLATELET # BLD AUTO: 226 X10(3)/MCL (ref 130–400)
PMV BLD AUTO: 10.8 FL (ref 7.4–10.4)
RBC # BLD AUTO: 3.79 X10(6)/MCL (ref 4.2–5.4)
WBC # BLD AUTO: 10.3 X10(3)/MCL (ref 4.5–11.5)

## 2025-02-21 PROCEDURE — 82950 GLUCOSE TEST: CPT

## 2025-02-21 PROCEDURE — 85027 COMPLETE CBC AUTOMATED: CPT

## 2025-02-21 PROCEDURE — 36415 COLL VENOUS BLD VENIPUNCTURE: CPT

## 2025-04-29 LAB — STREP B PCR (OHS): NOT DETECTED

## 2025-05-07 ENCOUNTER — ANESTHESIA EVENT (OUTPATIENT)
Dept: OBSTETRICS AND GYNECOLOGY | Facility: HOSPITAL | Age: 35
End: 2025-05-07
Payer: COMMERCIAL

## 2025-05-08 ENCOUNTER — HOSPITAL ENCOUNTER (INPATIENT)
Facility: HOSPITAL | Age: 35
LOS: 3 days | Discharge: HOME | End: 2025-05-11
Attending: OBSTETRICS & GYNECOLOGY | Admitting: OBSTETRICS & GYNECOLOGY
Payer: COMMERCIAL

## 2025-05-08 DIAGNOSIS — K51.20 ULCERATIVE (CHRONIC) PROCTITIS WITHOUT COMPLICATIONS: ICD-10-CM

## 2025-05-08 DIAGNOSIS — O16.3 ELEVATED BLOOD PRESSURE AFFECTING PREGNANCY IN THIRD TRIMESTER, ANTEPARTUM: Primary | ICD-10-CM

## 2025-05-08 LAB
ALBUMIN SERPL-MCNC: 2.8 G/DL (ref 3.5–5)
ALBUMIN/GLOB SERPL: 0.8 RATIO (ref 1.1–2)
ALP SERPL-CCNC: 167 UNIT/L (ref 40–150)
ALT SERPL-CCNC: 15 UNIT/L (ref 0–55)
ANION GAP SERPL CALC-SCNC: 7 MEQ/L
AST SERPL-CCNC: 22 UNIT/L (ref 11–45)
BASOPHILS # BLD AUTO: 0.03 X10(3)/MCL
BASOPHILS NFR BLD AUTO: 0.3 %
BILIRUB SERPL-MCNC: 0.2 MG/DL
BUN SERPL-MCNC: 8.9 MG/DL (ref 7–18.7)
CALCIUM SERPL-MCNC: 8.9 MG/DL (ref 8.4–10.2)
CHLORIDE SERPL-SCNC: 106 MMOL/L (ref 98–107)
CO2 SERPL-SCNC: 20 MMOL/L (ref 22–29)
CREAT SERPL-MCNC: 0.72 MG/DL (ref 0.55–1.02)
CREAT UR-MCNC: 43.8 MG/DL (ref 45–106)
CREAT/UREA NIT SERPL: 12
EOSINOPHIL # BLD AUTO: 0.09 X10(3)/MCL (ref 0–0.9)
EOSINOPHIL NFR BLD AUTO: 0.8 %
ERYTHROCYTE [DISTWIDTH] IN BLOOD BY AUTOMATED COUNT: 13.2 % (ref 11.5–17)
GFR SERPLBLD CREATININE-BSD FMLA CKD-EPI: >60 ML/MIN/1.73/M2
GLOBULIN SER-MCNC: 3.6 GM/DL (ref 2.4–3.5)
GLUCOSE SERPL-MCNC: 69 MG/DL (ref 74–100)
GROUP & RH: NORMAL
HCT VFR BLD AUTO: 34.3 % (ref 37–47)
HGB BLD-MCNC: 11.3 G/DL (ref 12–16)
IMM GRANULOCYTES # BLD AUTO: 0.06 X10(3)/MCL (ref 0–0.04)
IMM GRANULOCYTES NFR BLD AUTO: 0.5 %
INDIRECT COOMBS: NORMAL
LDH SERPL-CCNC: 200 U/L (ref 125–220)
LYMPHOCYTES # BLD AUTO: 1.73 X10(3)/MCL (ref 0.6–4.6)
LYMPHOCYTES NFR BLD AUTO: 15.6 %
MCH RBC QN AUTO: 29.2 PG (ref 27–31)
MCHC RBC AUTO-ENTMCNC: 32.9 G/DL (ref 33–36)
MCV RBC AUTO: 88.6 FL (ref 80–94)
MONOCYTES # BLD AUTO: 0.67 X10(3)/MCL (ref 0.1–1.3)
MONOCYTES NFR BLD AUTO: 6 %
NEUTROPHILS # BLD AUTO: 8.51 X10(3)/MCL (ref 2.1–9.2)
NEUTROPHILS NFR BLD AUTO: 76.8 %
NRBC BLD AUTO-RTO: 0 %
PLATELET # BLD AUTO: 257 X10(3)/MCL (ref 130–400)
PMV BLD AUTO: 11.6 FL (ref 7.4–10.4)
POTASSIUM SERPL-SCNC: 4.1 MMOL/L (ref 3.5–5.1)
PROT SERPL-MCNC: 6.4 GM/DL (ref 6.4–8.3)
PROT UR STRIP-MCNC: <6.8 MG/DL
RBC # BLD AUTO: 3.87 X10(6)/MCL (ref 4.2–5.4)
SODIUM SERPL-SCNC: 133 MMOL/L (ref 136–145)
SPECIMEN OUTDATE: NORMAL
T PALLIDUM AB SER QL: NONREACTIVE
URATE SERPL-MCNC: 4.6 MG/DL (ref 2.6–6)
WBC # BLD AUTO: 11.09 X10(3)/MCL (ref 4.5–11.5)

## 2025-05-08 PROCEDURE — 25000003 PHARM REV CODE 250: Performed by: OBSTETRICS & GYNECOLOGY

## 2025-05-08 PROCEDURE — 36004724 HC OB OR TIME LEV III - 1ST 15 MIN: Performed by: OBSTETRICS & GYNECOLOGY

## 2025-05-08 PROCEDURE — 82570 ASSAY OF URINE CREATININE: CPT | Performed by: OBSTETRICS & GYNECOLOGY

## 2025-05-08 PROCEDURE — 27200918 HC ALEXIS O RING

## 2025-05-08 PROCEDURE — 71000039 HC RECOVERY, EACH ADD'L HOUR: Performed by: OBSTETRICS & GYNECOLOGY

## 2025-05-08 PROCEDURE — 84550 ASSAY OF BLOOD/URIC ACID: CPT | Performed by: OBSTETRICS & GYNECOLOGY

## 2025-05-08 PROCEDURE — 27000492 HC SLEEVE, SCD T/L

## 2025-05-08 PROCEDURE — 86901 BLOOD TYPING SEROLOGIC RH(D): CPT | Performed by: OBSTETRICS & GYNECOLOGY

## 2025-05-08 PROCEDURE — 63600175 PHARM REV CODE 636 W HCPCS: Performed by: OBSTETRICS & GYNECOLOGY

## 2025-05-08 PROCEDURE — 25000003 PHARM REV CODE 250: Performed by: STUDENT IN AN ORGANIZED HEALTH CARE EDUCATION/TRAINING PROGRAM

## 2025-05-08 PROCEDURE — 63600175 PHARM REV CODE 636 W HCPCS: Performed by: ANESTHESIOLOGY

## 2025-05-08 PROCEDURE — 83615 LACTATE (LD) (LDH) ENZYME: CPT | Performed by: OBSTETRICS & GYNECOLOGY

## 2025-05-08 PROCEDURE — 37000009 HC ANESTHESIA EA ADD 15 MINS: Performed by: OBSTETRICS & GYNECOLOGY

## 2025-05-08 PROCEDURE — 63600175 PHARM REV CODE 636 W HCPCS: Performed by: STUDENT IN AN ORGANIZED HEALTH CARE EDUCATION/TRAINING PROGRAM

## 2025-05-08 PROCEDURE — 85025 COMPLETE CBC W/AUTO DIFF WBC: CPT | Performed by: OBSTETRICS & GYNECOLOGY

## 2025-05-08 PROCEDURE — 80053 COMPREHEN METABOLIC PANEL: CPT | Performed by: OBSTETRICS & GYNECOLOGY

## 2025-05-08 PROCEDURE — 11000001 HC ACUTE MED/SURG PRIVATE ROOM

## 2025-05-08 PROCEDURE — 51702 INSERT TEMP BLADDER CATH: CPT

## 2025-05-08 PROCEDURE — 71000033 HC RECOVERY, INTIAL HOUR: Performed by: OBSTETRICS & GYNECOLOGY

## 2025-05-08 PROCEDURE — 86780 TREPONEMA PALLIDUM: CPT | Performed by: OBSTETRICS & GYNECOLOGY

## 2025-05-08 PROCEDURE — 37000008 HC ANESTHESIA 1ST 15 MINUTES: Performed by: OBSTETRICS & GYNECOLOGY

## 2025-05-08 PROCEDURE — 36004725 HC OB OR TIME LEV III - EA ADD 15 MIN: Performed by: OBSTETRICS & GYNECOLOGY

## 2025-05-08 PROCEDURE — 27201423 OPTIME MED/SURG SUP & DEVICES STERILE SUPPLY: Performed by: OBSTETRICS & GYNECOLOGY

## 2025-05-08 RX ORDER — BISACODYL 10 MG/1
10 SUPPOSITORY RECTAL ONCE AS NEEDED
Status: DISCONTINUED | OUTPATIENT
Start: 2025-05-08 | End: 2025-05-11 | Stop reason: HOSPADM

## 2025-05-08 RX ORDER — OXYTOCIN-SODIUM CHLORIDE 0.9% IV SOLN 30 UNIT/500ML 30-0.9/5 UT/ML-%
SOLUTION INTRAVENOUS
Status: DISCONTINUED | OUTPATIENT
Start: 2025-05-08 | End: 2025-05-08

## 2025-05-08 RX ORDER — OXYTOCIN-SODIUM CHLORIDE 0.9% IV SOLN 30 UNIT/500ML 30-0.9/5 UT/ML-%
95 SOLUTION INTRAVENOUS CONTINUOUS PRN
Status: DISCONTINUED | OUTPATIENT
Start: 2025-05-08 | End: 2025-05-11 | Stop reason: HOSPADM

## 2025-05-08 RX ORDER — SODIUM CITRATE AND CITRIC ACID MONOHYDRATE 334; 500 MG/5ML; MG/5ML
30 SOLUTION ORAL
Status: DISCONTINUED | OUTPATIENT
Start: 2025-05-08 | End: 2025-05-08

## 2025-05-08 RX ORDER — KETOROLAC TROMETHAMINE 30 MG/ML
INJECTION, SOLUTION INTRAMUSCULAR; INTRAVENOUS
Status: DISCONTINUED | OUTPATIENT
Start: 2025-05-08 | End: 2025-05-08

## 2025-05-08 RX ORDER — CARBOPROST TROMETHAMINE 250 UG/ML
250 INJECTION, SOLUTION INTRAMUSCULAR
Status: DISCONTINUED | OUTPATIENT
Start: 2025-05-08 | End: 2025-05-11 | Stop reason: HOSPADM

## 2025-05-08 RX ORDER — MISOPROSTOL 100 UG/1
800 TABLET ORAL ONCE AS NEEDED
Status: DISCONTINUED | OUTPATIENT
Start: 2025-05-08 | End: 2025-05-11 | Stop reason: HOSPADM

## 2025-05-08 RX ORDER — BUPIVACAINE HYDROCHLORIDE 7.5 MG/ML
INJECTION, SOLUTION EPIDURAL; RETROBULBAR
Status: COMPLETED | OUTPATIENT
Start: 2025-05-08 | End: 2025-05-08

## 2025-05-08 RX ORDER — OXYCODONE AND ACETAMINOPHEN 10; 325 MG/1; MG/1
1 TABLET ORAL EVERY 4 HOURS PRN
Status: DISCONTINUED | OUTPATIENT
Start: 2025-05-08 | End: 2025-05-11 | Stop reason: HOSPADM

## 2025-05-08 RX ORDER — IBUPROFEN 800 MG/1
800 TABLET, FILM COATED ORAL EVERY 8 HOURS
Status: DISCONTINUED | OUTPATIENT
Start: 2025-05-09 | End: 2025-05-08

## 2025-05-08 RX ORDER — KETOROLAC TROMETHAMINE 30 MG/ML
30 INJECTION, SOLUTION INTRAMUSCULAR; INTRAVENOUS EVERY 8 HOURS
Status: DISCONTINUED | OUTPATIENT
Start: 2025-05-09 | End: 2025-05-08

## 2025-05-08 RX ORDER — OXYTOCIN 10 [USP'U]/ML
10 INJECTION, SOLUTION INTRAMUSCULAR; INTRAVENOUS ONCE AS NEEDED
Status: DISCONTINUED | OUTPATIENT
Start: 2025-05-08 | End: 2025-05-11 | Stop reason: HOSPADM

## 2025-05-08 RX ORDER — ACETAMINOPHEN 500 MG
1000 TABLET ORAL EVERY 6 HOURS PRN
Status: DISCONTINUED | OUTPATIENT
Start: 2025-05-08 | End: 2025-05-11 | Stop reason: HOSPADM

## 2025-05-08 RX ORDER — OXYTOCIN-SODIUM CHLORIDE 0.9% IV SOLN 30 UNIT/500ML 30-0.9/5 UT/ML-%
10 SOLUTION INTRAVENOUS ONCE AS NEEDED
Status: DISCONTINUED | OUTPATIENT
Start: 2025-05-08 | End: 2025-05-11 | Stop reason: HOSPADM

## 2025-05-08 RX ORDER — PHENYLEPHRINE HCL IN 0.9% NACL 1 MG/10 ML
SYRINGE (ML) INTRAVENOUS
Status: DISCONTINUED | OUTPATIENT
Start: 2025-05-08 | End: 2025-05-08

## 2025-05-08 RX ORDER — IBUPROFEN 800 MG/1
800 TABLET, FILM COATED ORAL EVERY 8 HOURS PRN
Status: DISCONTINUED | OUTPATIENT
Start: 2025-05-08 | End: 2025-05-11 | Stop reason: HOSPADM

## 2025-05-08 RX ORDER — METHYLERGONOVINE MALEATE 0.2 MG/ML
200 INJECTION INTRAVENOUS ONCE AS NEEDED
Status: DISCONTINUED | OUTPATIENT
Start: 2025-05-08 | End: 2025-05-11 | Stop reason: HOSPADM

## 2025-05-08 RX ORDER — MORPHINE SULFATE 4 MG/ML
2 INJECTION, SOLUTION INTRAMUSCULAR; INTRAVENOUS
Status: DISCONTINUED | OUTPATIENT
Start: 2025-05-08 | End: 2025-05-11 | Stop reason: HOSPADM

## 2025-05-08 RX ORDER — CEFAZOLIN SODIUM 1 G/3ML
INJECTION, POWDER, FOR SOLUTION INTRAMUSCULAR; INTRAVENOUS
Status: DISCONTINUED | OUTPATIENT
Start: 2025-05-08 | End: 2025-05-08

## 2025-05-08 RX ORDER — MUPIROCIN 20 MG/G
OINTMENT TOPICAL
Status: CANCELLED | OUTPATIENT
Start: 2025-05-08

## 2025-05-08 RX ORDER — FAMOTIDINE 10 MG/ML
20 INJECTION, SOLUTION INTRAVENOUS
Status: DISCONTINUED | OUTPATIENT
Start: 2025-05-08 | End: 2025-05-11 | Stop reason: HOSPADM

## 2025-05-08 RX ORDER — OXYTOCIN-SODIUM CHLORIDE 0.9% IV SOLN 30 UNIT/500ML 30-0.9/5 UT/ML-%
95 SOLUTION INTRAVENOUS ONCE AS NEEDED
Status: DISCONTINUED | OUTPATIENT
Start: 2025-05-08 | End: 2025-05-11 | Stop reason: HOSPADM

## 2025-05-08 RX ORDER — EPHEDRINE SULFATE 50 MG/ML
10 INJECTION, SOLUTION INTRAVENOUS
Status: ACTIVE | OUTPATIENT
Start: 2025-05-08 | End: 2025-05-08

## 2025-05-08 RX ORDER — DEXAMETHASONE SODIUM PHOSPHATE 4 MG/ML
INJECTION, SOLUTION INTRA-ARTICULAR; INTRALESIONAL; INTRAMUSCULAR; INTRAVENOUS; SOFT TISSUE
Status: DISCONTINUED | OUTPATIENT
Start: 2025-05-08 | End: 2025-05-08

## 2025-05-08 RX ORDER — ONDANSETRON 4 MG/1
8 TABLET, ORALLY DISINTEGRATING ORAL EVERY 8 HOURS PRN
Status: DISCONTINUED | OUTPATIENT
Start: 2025-05-08 | End: 2025-05-11 | Stop reason: HOSPADM

## 2025-05-08 RX ORDER — METHYLERGONOVINE MALEATE 0.2 MG/ML
200 INJECTION INTRAVENOUS
Status: DISCONTINUED | OUTPATIENT
Start: 2025-05-08 | End: 2025-05-11 | Stop reason: HOSPADM

## 2025-05-08 RX ORDER — PRENATAL WITH FERROUS FUM AND FOLIC ACID 3080; 920; 120; 400; 22; 1.84; 3; 20; 10; 1; 12; 200; 27; 25; 2 [IU]/1; [IU]/1; MG/1; [IU]/1; MG/1; MG/1; MG/1; MG/1; MG/1; MG/1; UG/1; MG/1; MG/1; MG/1; MG/1
1 TABLET ORAL DAILY
Status: DISCONTINUED | OUTPATIENT
Start: 2025-05-09 | End: 2025-05-11 | Stop reason: HOSPADM

## 2025-05-08 RX ORDER — ADHESIVE BANDAGE
30 BANDAGE TOPICAL 2 TIMES DAILY PRN
Status: DISCONTINUED | OUTPATIENT
Start: 2025-05-09 | End: 2025-05-11 | Stop reason: HOSPADM

## 2025-05-08 RX ORDER — SIMETHICONE 80 MG
1 TABLET,CHEWABLE ORAL EVERY 6 HOURS PRN
Status: DISCONTINUED | OUTPATIENT
Start: 2025-05-08 | End: 2025-05-11 | Stop reason: HOSPADM

## 2025-05-08 RX ORDER — HYDROMORPHONE HYDROCHLORIDE 2 MG/ML
1 INJECTION, SOLUTION INTRAMUSCULAR; INTRAVENOUS; SUBCUTANEOUS EVERY 4 HOURS PRN
Status: DISCONTINUED | OUTPATIENT
Start: 2025-05-08 | End: 2025-05-11 | Stop reason: HOSPADM

## 2025-05-08 RX ORDER — DOCUSATE SODIUM 100 MG/1
200 CAPSULE, LIQUID FILLED ORAL 2 TIMES DAILY
Status: DISCONTINUED | OUTPATIENT
Start: 2025-05-08 | End: 2025-05-11 | Stop reason: HOSPADM

## 2025-05-08 RX ORDER — AMOXICILLIN 250 MG
1 CAPSULE ORAL NIGHTLY PRN
Status: DISCONTINUED | OUTPATIENT
Start: 2025-05-08 | End: 2025-05-11 | Stop reason: HOSPADM

## 2025-05-08 RX ORDER — SODIUM CITRATE AND CITRIC ACID MONOHYDRATE 334; 500 MG/5ML; MG/5ML
30 SOLUTION ORAL ONCE
Status: DISCONTINUED | OUTPATIENT
Start: 2025-05-08 | End: 2025-05-11 | Stop reason: HOSPADM

## 2025-05-08 RX ORDER — MORPHINE SULFATE 0.5 MG/ML
INJECTION, SOLUTION EPIDURAL; INTRATHECAL; INTRAVENOUS
Status: DISCONTINUED | OUTPATIENT
Start: 2025-05-08 | End: 2025-05-08

## 2025-05-08 RX ORDER — MISOPROSTOL 100 UG/1
800 TABLET ORAL
Status: DISCONTINUED | OUTPATIENT
Start: 2025-05-08 | End: 2025-05-11 | Stop reason: HOSPADM

## 2025-05-08 RX ORDER — SODIUM CHLORIDE 0.9 % (FLUSH) 0.9 %
10 SYRINGE (ML) INJECTION
Status: DISCONTINUED | OUTPATIENT
Start: 2025-05-08 | End: 2025-05-11 | Stop reason: HOSPADM

## 2025-05-08 RX ORDER — SODIUM CHLORIDE, SODIUM LACTATE, POTASSIUM CHLORIDE, CALCIUM CHLORIDE 600; 310; 30; 20 MG/100ML; MG/100ML; MG/100ML; MG/100ML
INJECTION, SOLUTION INTRAVENOUS CONTINUOUS
Status: DISCONTINUED | OUTPATIENT
Start: 2025-05-08 | End: 2025-05-08

## 2025-05-08 RX ORDER — ONDANSETRON HYDROCHLORIDE 2 MG/ML
INJECTION, SOLUTION INTRAVENOUS
Status: DISCONTINUED | OUTPATIENT
Start: 2025-05-08 | End: 2025-05-08

## 2025-05-08 RX ORDER — DIPHENHYDRAMINE HCL 25 MG
25 CAPSULE ORAL EVERY 4 HOURS PRN
Status: DISCONTINUED | OUTPATIENT
Start: 2025-05-08 | End: 2025-05-11 | Stop reason: HOSPADM

## 2025-05-08 RX ORDER — FENTANYL CITRATE 50 UG/ML
INJECTION, SOLUTION INTRAMUSCULAR; INTRAVENOUS
Status: DISCONTINUED | OUTPATIENT
Start: 2025-05-08 | End: 2025-05-08

## 2025-05-08 RX ORDER — DIPHENOXYLATE HYDROCHLORIDE AND ATROPINE SULFATE 2.5; .025 MG/1; MG/1
2 TABLET ORAL EVERY 6 HOURS PRN
Status: DISCONTINUED | OUTPATIENT
Start: 2025-05-08 | End: 2025-05-11 | Stop reason: HOSPADM

## 2025-05-08 RX ORDER — ACETAMINOPHEN 10 MG/ML
INJECTION, SOLUTION INTRAVENOUS
Status: DISCONTINUED | OUTPATIENT
Start: 2025-05-08 | End: 2025-05-08

## 2025-05-08 RX ORDER — TERBUTALINE SULFATE 1 MG/ML
0.25 INJECTION SUBCUTANEOUS ONCE
Status: COMPLETED | OUTPATIENT
Start: 2025-05-08 | End: 2025-05-08

## 2025-05-08 RX ADMIN — FENTANYL CITRATE 10 MCG: 50 INJECTION, SOLUTION INTRAMUSCULAR; INTRAVENOUS at 04:05

## 2025-05-08 RX ADMIN — SODIUM CHLORIDE, POTASSIUM CHLORIDE, SODIUM LACTATE AND CALCIUM CHLORIDE: 600; 310; 30; 20 INJECTION, SOLUTION INTRAVENOUS at 02:05

## 2025-05-08 RX ADMIN — IBUPROFEN 800 MG: 800 TABLET ORAL at 11:05

## 2025-05-08 RX ADMIN — ACETAMINOPHEN 1000 MG: 10 INJECTION, SOLUTION INTRAVENOUS at 04:05

## 2025-05-08 RX ADMIN — OXYCODONE AND ACETAMINOPHEN 1 TABLET: 325; 10 TABLET ORAL at 10:05

## 2025-05-08 RX ADMIN — SODIUM CHLORIDE, POTASSIUM CHLORIDE, SODIUM LACTATE AND CALCIUM CHLORIDE 1000 ML: 600; 310; 30; 20 INJECTION, SOLUTION INTRAVENOUS at 03:05

## 2025-05-08 RX ADMIN — KETOROLAC TROMETHAMINE 30 MG: 30 INJECTION, SOLUTION INTRAMUSCULAR; INTRAVENOUS at 05:05

## 2025-05-08 RX ADMIN — CEFAZOLIN 2 G: 330 INJECTION, POWDER, FOR SOLUTION INTRAMUSCULAR; INTRAVENOUS at 04:05

## 2025-05-08 RX ADMIN — MORPHINE SULFATE 0.1 MG: 0.5 INJECTION EPIDURAL; INTRATHECAL; INTRAVENOUS at 04:05

## 2025-05-08 RX ADMIN — BUPIVACAINE HYDROCHLORIDE 1.6 ML: 7.5 INJECTION, SOLUTION EPIDURAL; RETROBULBAR at 04:05

## 2025-05-08 RX ADMIN — FAMOTIDINE 20 MG: 10 INJECTION, SOLUTION INTRAVENOUS at 02:05

## 2025-05-08 RX ADMIN — DEXAMETHASONE SODIUM PHOSPHATE 4 MG: 4 INJECTION, SOLUTION INTRA-ARTICULAR; INTRALESIONAL; INTRAMUSCULAR; INTRAVENOUS; SOFT TISSUE at 04:05

## 2025-05-08 RX ADMIN — Medication 200 MCG: at 04:05

## 2025-05-08 RX ADMIN — TERBUTALINE SULFATE 0.25 MG: 1 INJECTION, SOLUTION SUBCUTANEOUS at 01:05

## 2025-05-08 RX ADMIN — ONDANSETRON 4 MG: 2 INJECTION INTRAMUSCULAR; INTRAVENOUS at 04:05

## 2025-05-08 RX ADMIN — Medication 30 UNITS: at 04:05

## 2025-05-08 RX ADMIN — SODIUM CITRATE AND CITRIC ACID MONOHYDRATE 30 ML: 500; 334 SOLUTION ORAL at 02:05

## 2025-05-08 RX ADMIN — Medication 30 UNITS: at 05:05

## 2025-05-08 RX ADMIN — SODIUM CHLORIDE, POTASSIUM CHLORIDE, SODIUM LACTATE AND CALCIUM CHLORIDE: 600; 310; 30; 20 INJECTION, SOLUTION INTRAVENOUS at 04:05

## 2025-05-08 NOTE — ANESTHESIA PREPROCEDURE EVALUATION
"2025    Renee Aponte is a 34 y.o., female.    Pre-operative evaluation for Procedure(s) (LRB):   SECTION (N/A)    Pre-op Assessment    I have reviewed the Patient Summary Reports.     I have reviewed the Nursing Notes. I have reviewed the NPO Status.   I have reviewed the Medications.     Review of Systems  Anesthesia Hx:  No problems with previous Anesthesia                    Past Medical History:   Diagnosis Date    Female infertility, unspecified     HSV (herpes simplex virus) infection     Insulin resistance     Pneumomediastinum 2019    Ulcerative colitis        Problem List[1]    Review of patient's allergies indicates:   Allergen Reactions    Adhesive Rash       Current Outpatient Medications   Medication Instructions    cefadroxil (DURICEF) 500 mg, Oral, 2 times daily    dextroamphetamine-amphetamine (ADDERALL) 20 mg tablet Take 1 tablet by mouth twice daily.    medroxyPROGESTERone (PROVERA) 10 mg    mesalamine (LIALDA) 2.4 g    metFORMIN (GLUCOPHAGE) 850 mg, 2 times daily    nifedipine 0.3%-lidocaine 1.5% topical ointment Topical (Top), 3 times daily, Apply a pea sized amount three times a day to the anus.    NOVAREL 5,000 unit SolR Inject into the skin.    ondansetron (ZOFRAN) 8 mg, Oral, Every 8 hours PRN    prenat.vits,dana,min-iron-folic Tab Daily    tretinoin (RETIN-A) 0.05 % cream Topical (Top), Nightly    valACYclovir (VALTREX) 500 mg, Oral, As needed (PRN)       Past Surgical History:   Procedure Laterality Date    EYE SURGERY      HSG Bilateral        Social History[2]    BP (!) 143/84   Pulse (!) 118   Temp 36.7 °C (98.1 °F)   Ht 5' 2" (1.575 m)   Wt 70.3 kg (155 lb)   LMP 2024 (Exact Date)   SpO2 99%   Breastfeeding No   BMI 28.35 kg/m²       Physical Exam  General: Well nourished and Cooperative    Airway:  Mallampati: II   Mouth Opening: Normal  TM Distance: Normal  Tongue: Normal  Neck ROM: Normal ROM    Dental:  Intact    Chest/Lungs:  Clear to " "auscultation    Heart:  Rhythm: Regular Rhythm        Lab Results   Component Value Date    WBC 11.09 05/08/2025    HGB 11.3 (L) 05/08/2025    HCT 34.3 (L) 05/08/2025    MCV 88.6 05/08/2025     05/08/2025          BMP  Lab Results   Component Value Date    HCT 34.3 (L) 05/08/2025     (L) 05/08/2025    K 4.1 05/08/2025    BUN 8.9 05/08/2025    CREATININE 0.72 05/08/2025    CALCIUM 8.9 05/08/2025        INR  No results for input(s): "PT", "INR", "PROTIME", "APTT" in the last 72 hours.      Diagnostic Studies:  .  EKG  No results found for this or any previous visit.      Anesthesia Plan  Type of Anesthesia, risks & benefits discussed:    Anesthesia Type: Regional  Intra-op Monitoring Plan: Standard ASA Monitors  Post Op Pain Control Plan: multimodal analgesia  Informed Consent: Informed consent signed with the Patient and all parties understand the risks and agree with anesthesia plan.  All questions answered.   ASA Score: 2  Day of Surgery Review of History & Physical: H&P Update referred to the surgeon/provider.    Ready For Surgery From Anesthesia Perspective.     .  Anesthesia consent includes material facts, risks, complications & alternatives, and possibility of altering the anesthesia plan due to intraoperative conditions.    I reviewed problem list, prior to admission medication list, appropriate labs, any workup, Xray, EKG etc   See anesthesia chart for details of the anesthesia plan carried out.       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 complications   [2]   Social History  Socioeconomic History    Marital status:     Number of children: 0   Occupational History    Occupation: dental hygenist   Tobacco Use    Smoking status: Never    Smokeless tobacco: Never   Substance and Sexual Activity    Alcohol use: Yes     Comment: " 1-2 times a month    Drug use: Never    Sexual activity: Yes     Partners: Male     Social Drivers of Health     Financial Resource Strain: Low Risk  (5/8/2025)    Overall Financial Resource Strain (CARDIA)     Difficulty of Paying Living Expenses: Not hard at all   Food Insecurity: No Food Insecurity (5/8/2025)    Hunger Vital Sign     Worried About Running Out of Food in the Last Year: Never true     Ran Out of Food in the Last Year: Never true   Transportation Needs: No Transportation Needs (5/8/2025)    PRAPARE - Transportation     Lack of Transportation (Medical): No     Lack of Transportation (Non-Medical): No   Physical Activity: Insufficiently Active (2/14/2024)    Exercise Vital Sign     Days of Exercise per Week: 3 days     Minutes of Exercise per Session: 40 min   Stress: No Stress Concern Present (5/8/2025)    [employer] - Occupational Stress Questionnaire     Feeling of Stress : Not at all   Housing Stability: High Risk (5/8/2025)    Housing Stability Vital Sign     Unable to Pay for Housing in the Last Year: No     Number of Times Moved in the Last Year: 2     Homeless in the Last Year: No

## 2025-05-08 NOTE — PLAN OF CARE
Problem: Adult Inpatient Plan of Care  Goal: Plan of Care Review  Outcome: Progressing  Goal: Patient-Specific Goal (Individualized)  Outcome: Progressing  Goal: Absence of Hospital-Acquired Illness or Injury  Outcome: Progressing  Goal: Optimal Comfort and Wellbeing  Outcome: Progressing  Goal: Readiness for Transition of Care  Outcome: Progressing     Problem:  Fall Injury Risk  Goal: Absence of Fall, Infant Drop and Related Injury  Outcome: Progressing     Problem: Infection  Goal: Absence of Infection Signs and Symptoms  Outcome: Progressing     Problem: Labor  Goal: Hemostasis  Outcome: Progressing  Goal: Stable Fetal Wellbeing  Outcome: Progressing  Goal: Effective Progression to Delivery  Outcome: Progressing  Goal: Absence of Infection Signs and Symptoms  Outcome: Progressing  Goal: Acceptable Pain Control  Outcome: Progressing

## 2025-05-08 NOTE — H&P
[facility] - Labor and Delivery  Obstetrics  History & Physical    Patient Name: Renee Aponte  MRN: [id number]  Admission Date: 2025  Primary Care Provider: Johnny Rainey MD    Subjective:     Principal Problem:<principal problem not specified>    History of Present Illness: PAT    Obstetric HPI:  Patient reports Frequency: Every 4 minutes contractions, active fetal movement, No vaginal bleeding , No loss of fluid     This pregnancy has been complicated by Breech, elevated BP    OB History    Para Term  AB Living   1 0 0 0 0 0   SAB IAB Ectopic Multiple Live Births   0 0 0 0 0      # Outcome Date GA Lbr Gilberto/2nd Weight Sex Type Anes PTL Lv   1 Current              Past Medical History:   Diagnosis Date    Female infertility, unspecified     HSV (herpes simplex virus) infection     Insulin resistance     Pneumomediastinum 2019    Ulcerative colitis      Past Surgical History:   Procedure Laterality Date    EYE SURGERY      HSG Bilateral        PTA Medications   Medication Sig    mesalamine (LIALDA) 1.2 gram TbEC Take 2.4 g by mouth.    metFORMIN (GLUCOPHAGE) 850 MG tablet Take 850 mg by mouth 2 (two) times daily.    prenat.vits,dana,min-iron-folic Tab Take by mouth Daily.    cefadroxil (DURICEF) 500 MG Cap Take 500 mg by mouth 2 (two) times daily.    dextroamphetamine-amphetamine (ADDERALL) 20 mg tablet Take 1 tablet by mouth twice daily.    medroxyPROGESTERone (PROVERA) 10 MG tablet Take 10 mg by mouth. (Patient not taking: Reported on 2024)    nifedipine 0.3%-lidocaine 1.5% topical ointment Apply topically 3 (three) times daily. Apply a pea sized amount three times a day to the anus.    NOVAREL 5,000 unit SolR Inject into the skin. (Patient not taking: Reported on 2024)    ondansetron (ZOFRAN) 8 MG tablet Take 8 mg by mouth every 8 (eight) hours as needed for Nausea.    tretinoin (RETIN-A) 0.05 % cream Apply topically every evening.    valACYclovir (VALTREX) 500 MG  tablet Take 500 mg by mouth as needed.       Review of patient's allergies indicates:   Allergen Reactions    Adhesive Rash        Family History       Problem Relation (Age of Onset)    No Known Problems Mother, Father, Brother          Tobacco Use    Smoking status: Never    Smokeless tobacco: Never   Substance and Sexual Activity    Alcohol use: Yes     Comment: 1-2 times a month    Drug use: Never    Sexual activity: Yes     Partners: Male     Review of Systems   Objective:     Vital Signs (Most Recent):  Temp: 98.1 °F (36.7 °C) (25 1142)  Pulse: (!) 118 (25 1449)  BP: (!) 134/95 (25 1425)  SpO2: 99 % (25 1449) Vital Signs (24h Range):  Temp:  [98.1 °F (36.7 °C)] 98.1 °F (36.7 °C)  Pulse:  [] 118  SpO2:  [98 %-100 %] 99 %  BP: (119-162)/(73-95) 134/95     Weight: 70.3 kg (155 lb)  Body mass index is 28.35 kg/m².    FHT: 147Cat 1 (reassuring)  TOCO:  Q 4 minutes    Physical Exam    Cervix:  Dilation:  3  Effacement:  75%  Station: -1  Presentation: Breech     Significant Labs:  Lab Results   Component Value Date    GROUPTRH O POS 2025    HEPBSAG Nonreactive 10/22/2024       I have personallly reviewed all pertinent lab results from the last 24 hours.    Assessment/Plan:     34 y.o. female  at 37w0d for:    There are no hospital problems to display for this patient.      DAMARIS Villareal MD  Obstetrics  [facility] - Labor and Delivery

## 2025-05-08 NOTE — ANESTHESIA POSTPROCEDURE EVALUATION
Anesthesia Post Evaluation    Patient: Renee Rosarioa    Procedure(s) Performed: Procedure(s) (LRB):   SECTION (N/A)    Final Anesthesia Type: regional (Regional comprises either epidural or spinal, and conversion to General if required)      Patient location during evaluation: labor & delivery  Patient participation: Yes- Able to Participate  Post-procedure mental status: @ baseline.  Post-procedure vital signs: reviewed and stable  Pain management: adequate  AQI66 BETTIE Mitigation: See pacu RN note for any measures applied.  PONV status: See postop meds for drugs used to control n/v if any.  Anesthetic complications: no      Cardiovascular status: stable  Respiratory status: @ baseline.            No complaints at this time.      Vitals Value Taken Time   /86 25 18:25   Temp 37.1 °C (98.8 °F) 25 18:25   Pulse 90 25 18:25   Resp 19 25 18:25   SpO2 99 % 25 18:25         Event Time   Out of Recovery 18:25:00         Pain/Haile Score: Haile Score: 9 (2025  6:25 PM)

## 2025-05-08 NOTE — ANESTHESIA PROCEDURE NOTES
Spinal    Diagnosis: for surgery  Patient location during procedure: OR  Start time: 5/8/2025 4:33 PM  Timeout: 5/8/2025 4:28 PM  End time: 5/8/2025 4:33 PM    Staffing  Authorizing Provider: Josr Chand MD  Performing Provider: Josr Chand MD    Staffing  Performed by: Josr Chand MD  Authorized by: Josr Chand MD    Preanesthetic Checklist  Completed: patient identified, IV checked, site marked, risks and benefits discussed, surgical consent, monitors and equipment checked, pre-op evaluation and timeout performed  Spinal Block  Patient position: sitting  Prep: ChloraPrep and Mask worn, hand hygeine performed, sterile gloves worn  Patient monitoring: heart rate, continuous pulse ox and frequent blood pressure checks  Approach: midline  Location: L3-4  Injection technique: single shot  CSF Fluid: clear free-flowing CSF  Needle  Needle type: pencil-tip (20G 1.25 inch introducer needle used to facilitate passage of spinal needle)   Needle gauge: 25 G  Needle length: 3.5 in  Additional Documentation: negative aspiration for heme, no paresthesia on injection and incremental injection  Needle localization: anatomical landmarks  Assessment  Sensory level: T4   Dermatomal levels determined by alcohol wipe  Ease of block: easy  Patient's tolerance of the procedure: comfortable throughout block (Sterile dressing applied to skin puncture site, pt returned to supine position)  Additional Notes  LAMONTE applied as needed.  Fentanyl 10 mcg & Duramorph 150 mcg added to spinal.  Medications:    Medications: bupivacaine (pf) (MARCAINE) injection 0.75% - Intraspinal   1.6 mL - 5/8/2025 4:32:00 PM

## 2025-05-08 NOTE — TRANSFER OF CARE
"Anesthesia Transfer of Care Note    Patient: Renee Arroyo Stoma    Procedure(s) Performed: Procedure(s) (LRB):   SECTION (N/A)    Patient location: Labor and Delivery    Anesthesia Type: spinal    Transport from OR: Transported from OR on room air with adequate spontaneous ventilation    Post pain: adequate analgesia    Post assessment: no apparent anesthetic complications    Post vital signs: stable    Level of consciousness: awake, alert and oriented    Nausea/Vomiting: no nausea/vomiting    Complications: none    Transfer of care protocol was followed      Last vitals: Visit Vitals  BP (!) 143/84   Pulse (!) 118   Temp 36.7 °C (98.1 °F)   Ht 5' 2" (1.575 m)   Wt 70.3 kg (155 lb)   LMP 2024 (Exact Date)   SpO2 99%   Breastfeeding No   BMI 28.35 kg/m²     "

## 2025-05-08 NOTE — OP NOTE
[facility]                       [address]                      [place] [address]    PATIENT NAME:      JULEE AGUIRRE  YOB: 1990  CSN:               [id number]  MRN:               [id number]  ADMIT DATE:        2025 11:07:00  PHYSICIAN:         Aj Villareal MD                          OPERATIVE REPORT      DATE OF SURGERY:    2025 00:00:00    SURGEON:  Aj Villarela MD    ASSISTANT:  Basil Villareal MD.    OPERATION PERFORMED:  Primary low transverse  section.    PREOPERATIVE DIAGNOSIS:  The patient is a 34-year-old white female at term   gestation with a breech presentation and labor.    POSTOPERATIVE DIAGNOSIS:  The patient is a 34-year-old white female at term   gestation with a breech presentation and labor.    PROCEDURE IN DETAIL:  The proper consents were obtained.  The patient was   brought to the operating room, placed in supine position, underwent adequate   prepping and draping.  Pfannenstiel skin incision was made in the usual fashion.    Abdomen was opened up in layers.  The peritoneum was entered atraumatically.    Bladder flap was created with the Metzenbaum scissors.  Low transverse uterine   incision was made without difficulty.  The fluid was noted to be clear.  The   infant was delivered in a breech presentation.  The cord was double clamped,   cut, and the infant was handed to the nurses.  The placenta was removed manually   and the uterus was exteriorized onto the abdominal surface.  The inside of the   uterus was cleaned with moist laps.  The lower uterine segment was then closed   with #1 chromic running locking stitch and hemostasis was noted to be achieved.    The uterus was placed back into the abdominal cavity and both gutters were   cleaned with moist laps.  The lower uterine segment was inspected one more time   and was noted to be absolutely dry.  Needle and lap  counts were correct x2.    Peritoneum was closed with 2-0 chromic simple stitch.  Fascia was closed with #1   Vicryl simple running stitch.  Subcutaneous tissue was closed with 2-0 plain in   interrupted stitch fashion.  Skin was closed with skin clips.  The patient   tolerated the procedure well, was brought to the recovery room in a stable   fashion.        ______________________________  Aj Villareal MD CEP/GILDA  DD:  05/08/2025  Time:  05:21PM  DT:  05/08/2025  Time:  06:34PM  Job #:  [id number]      OPERATIVE REPORT

## 2025-05-08 NOTE — PLAN OF CARE
Problem: Adult Inpatient Plan of Care  Goal: Plan of Care Review  Outcome: Progressing  Goal: Patient-Specific Goal (Individualized)  Outcome: Progressing  Goal: Absence of Hospital-Acquired Illness or Injury  Outcome: Progressing  Goal: Optimal Comfort and Wellbeing  Outcome: Progressing  Goal: Readiness for Transition of Care  Outcome: Progressing     Problem:  Fall Injury Risk  Goal: Absence of Fall, Infant Drop and Related Injury  Outcome: Progressing     Problem: Infection  Goal: Absence of Infection Signs and Symptoms  Outcome: Progressing     Problem: Labor  Goal: Hemostasis  Outcome: Progressing  Goal: Stable Fetal Wellbeing  Outcome: Progressing  Goal: Effective Progression to Delivery  Outcome: Progressing  Goal: Absence of Infection Signs and Symptoms  Outcome: Progressing  Goal: Acceptable Pain Control  Outcome: Progressing  Goal: Normal Uterine Contraction Pattern  Outcome: Progressing     Problem: Pain Acute  Goal: Optimal Pain Control and Function  Outcome: Progressing     Problem:  Delivery  Goal: Bleeding is Controlled  Outcome: Progressing  Goal: Stable Fetal Wellbeing  Outcome: Progressing  Goal: Absence of Infection Signs and Symptoms  Outcome: Progressing  Goal: Effective Oxygenation and Ventilation  Outcome: Progressing

## 2025-05-09 PROBLEM — Z3A.37 37 WEEKS GESTATION OF PREGNANCY: Status: ACTIVE | Noted: 2025-05-09

## 2025-05-09 LAB
BASOPHILS # BLD AUTO: 0.05 X10(3)/MCL
BASOPHILS NFR BLD AUTO: 0.3 %
EOSINOPHIL # BLD AUTO: 0.01 X10(3)/MCL (ref 0–0.9)
EOSINOPHIL NFR BLD AUTO: 0.1 %
ERYTHROCYTE [DISTWIDTH] IN BLOOD BY AUTOMATED COUNT: 13.2 % (ref 11.5–17)
HCT VFR BLD AUTO: 31.1 % (ref 37–47)
HGB BLD-MCNC: 10.4 G/DL (ref 12–16)
IMM GRANULOCYTES # BLD AUTO: 0.09 X10(3)/MCL (ref 0–0.04)
IMM GRANULOCYTES NFR BLD AUTO: 0.5 %
LYMPHOCYTES # BLD AUTO: 1.97 X10(3)/MCL (ref 0.6–4.6)
LYMPHOCYTES NFR BLD AUTO: 10.8 %
MCH RBC QN AUTO: 29.2 PG (ref 27–31)
MCHC RBC AUTO-ENTMCNC: 33.4 G/DL (ref 33–36)
MCV RBC AUTO: 87.4 FL (ref 80–94)
MONOCYTES # BLD AUTO: 1.26 X10(3)/MCL (ref 0.1–1.3)
MONOCYTES NFR BLD AUTO: 6.9 %
NEUTROPHILS # BLD AUTO: 14.94 X10(3)/MCL (ref 2.1–9.2)
NEUTROPHILS NFR BLD AUTO: 81.4 %
NRBC BLD AUTO-RTO: 0 %
PLATELET # BLD AUTO: 233 X10(3)/MCL (ref 130–400)
PMV BLD AUTO: 11.5 FL (ref 7.4–10.4)
RBC # BLD AUTO: 3.56 X10(6)/MCL (ref 4.2–5.4)
WBC # BLD AUTO: 18.32 X10(3)/MCL (ref 4.5–11.5)

## 2025-05-09 PROCEDURE — 36415 COLL VENOUS BLD VENIPUNCTURE: CPT | Performed by: OBSTETRICS & GYNECOLOGY

## 2025-05-09 PROCEDURE — 25000003 PHARM REV CODE 250: Performed by: OBSTETRICS & GYNECOLOGY

## 2025-05-09 PROCEDURE — 85025 COMPLETE CBC W/AUTO DIFF WBC: CPT | Performed by: OBSTETRICS & GYNECOLOGY

## 2025-05-09 PROCEDURE — 11000001 HC ACUTE MED/SURG PRIVATE ROOM

## 2025-05-09 PROCEDURE — 25000003 PHARM REV CODE 250: Performed by: STUDENT IN AN ORGANIZED HEALTH CARE EDUCATION/TRAINING PROGRAM

## 2025-05-09 RX ADMIN — IBUPROFEN 800 MG: 800 TABLET ORAL at 08:05

## 2025-05-09 RX ADMIN — SIMETHICONE 80 MG: 80 TABLET, CHEWABLE ORAL at 08:05

## 2025-05-09 RX ADMIN — OXYCODONE AND ACETAMINOPHEN 1 TABLET: 325; 10 TABLET ORAL at 11:05

## 2025-05-09 RX ADMIN — DOCUSATE SODIUM 200 MG: 100 CAPSULE, LIQUID FILLED ORAL at 08:05

## 2025-05-09 RX ADMIN — OXYCODONE AND ACETAMINOPHEN 1 TABLET: 325; 10 TABLET ORAL at 01:05

## 2025-05-09 RX ADMIN — PRENATAL VITAMINS-IRON FUMARATE 27 MG IRON-FOLIC ACID 0.8 MG TABLET 1 TABLET: at 08:05

## 2025-05-09 RX ADMIN — SIMETHICONE 80 MG: 80 TABLET, CHEWABLE ORAL at 01:05

## 2025-05-09 RX ADMIN — IBUPROFEN 800 MG: 800 TABLET ORAL at 05:05

## 2025-05-09 NOTE — PLAN OF CARE
Problem: Breastfeeding  Goal: Effective Breastfeeding  Intervention: Promote Effective Breastfeeding  Flowsheets (Taken 5/9/2025 0225)  Breastfeeding Assistance:   assisted with positioning   assisted with techniques for flat/inverted nipples   infant latch-on verified   feeding session observed   feeding cue recognition promoted   infant stimulated to wakeful state   support offered  Parent-Child Attachment Promotion:   parent/caregiver presence encouraged   strengths emphasized   face-to-face positioning promoted   cue recognition promoted   positive reinforcement provided   participation in care promoted  Intervention: Support Exclusive Breastfeeding Success  Flowsheets (Taken 5/9/2025 0225)  Supportive Measures:   active listening utilized   counseling provided   decision-making supported   goal-setting facilitated   relaxation techniques promoted   self-care encouraged   problem-solving facilitated   verbalization of feelings encouraged   positive reinforcement provided  Breastfeeding Support:   diary/feeding log utilized   encouragement provided   lactation counseling provided   maternal rest encouraged

## 2025-05-09 NOTE — PLAN OF CARE
Problem: Adult Inpatient Plan of Care  Goal: Plan of Care Review  Outcome: Progressing  Goal: Patient-Specific Goal (Individualized)  Outcome: Progressing  Goal: Absence of Hospital-Acquired Illness or Injury  Outcome: Progressing  Goal: Optimal Comfort and Wellbeing  Outcome: Progressing  Goal: Readiness for Transition of Care  Outcome: Progressing     Problem:  Fall Injury Risk  Goal: Absence of Fall, Infant Drop and Related Injury  Outcome: Progressing     Problem: Infection  Goal: Absence of Infection Signs and Symptoms  Outcome: Progressing     Problem: Pain Acute  Goal: Optimal Pain Control and Function  Outcome: Progressing     Problem: Wound  Goal: Optimal Coping  Outcome: Progressing  Goal: Optimal Functional Ability  Outcome: Progressing  Goal: Absence of Infection Signs and Symptoms  Outcome: Progressing  Goal: Improved Oral Intake  Outcome: Progressing  Goal: Optimal Pain Control and Function  Outcome: Progressing  Goal: Skin Health and Integrity  Outcome: Progressing  Goal: Optimal Wound Healing  Outcome: Progressing     Problem: Postpartum ( Delivery)  Goal: Successful Parent Role Transition  Outcome: Progressing  Goal: Hemostasis  Outcome: Progressing  Goal: Effective Bowel Elimination  Outcome: Progressing  Goal: Fluid and Electrolyte Balance  Outcome: Progressing  Goal: Absence of Infection Signs and Symptoms  Outcome: Progressing  Goal: Anesthesia/Sedation Recovery  Outcome: Progressing  Goal: Optimal Pain Control and Function  Outcome: Progressing  Goal: Nausea and Vomiting Relief  Outcome: Progressing  Goal: Effective Urinary Elimination  Outcome: Progressing  Goal: Effective Oxygenation and Ventilation  Outcome: Progressing     Problem: Breastfeeding  Goal: Effective Breastfeeding  Outcome: Progressing

## 2025-05-09 NOTE — PLAN OF CARE
Problem: Adult Inpatient Plan of Care  Goal: Plan of Care Review  2025 by Joceline Chapa LPN  Outcome: Progressing  2025 by Joceline Chapa LPN  Outcome: Progressing  Goal: Patient-Specific Goal (Individualized)  2025 by Joceline Chapa LPN  Outcome: Progressing  2025 by Joceline Chapa LPN  Outcome: Progressing  Goal: Absence of Hospital-Acquired Illness or Injury  2025 by Joceline Chapa LPN  Outcome: Progressing  2025 by Joceline Chapa LPN  Outcome: Progressing  Goal: Optimal Comfort and Wellbeing  2025 by Joceline Chapa LPN  Outcome: Progressing  2025 by Joceline Chapa LPN  Outcome: Progressing  Goal: Readiness for Transition of Care  2025 by Joceline Chapa LPN  Outcome: Progressing  2025 by Joceline Chapa LPN  Outcome: Progressing     Problem:  Fall Injury Risk  Goal: Absence of Fall, Infant Drop and Related Injury  2025 by Joceline Chapa LPN  Outcome: Progressing  2025 by Joceline Chapa LPN  Outcome: Progressing     Problem: Infection  Goal: Absence of Infection Signs and Symptoms  2025 by Joceline Chapa LPN  Outcome: Progressing  2025 by Joceline Chapa LPN  Outcome: Progressing     Problem: Pain Acute  Goal: Optimal Pain Control and Function  2025 by Joceline Chapa LPN  Outcome: Progressing  2025 by Joceline Chapa LPN  Outcome: Progressing     Problem: Wound  Goal: Optimal Coping  2025 by Joceline Chapa LPN  Outcome: Progressing  2025 by Joceline Chapa LPN  Outcome: Progressing  Goal: Optimal Functional Ability  2025 by Joceline Chapa LPN  Outcome: Progressing  2025 by Joceline Chapa LPN  Outcome: Progressing  Goal: Absence of Infection Signs and Symptoms  2025 by Joceline Chapa LPN  Outcome: Progressing  2025 by Joceline Chapa LPN  Outcome: Progressing  Goal: Improved Oral Intake  2025 by Joceline Chapa LPN  Outcome:  Progressing  2025 by Joceline Chapa LPN  Outcome: Progressing  Goal: Optimal Pain Control and Function  2025 by Joceline Chapa LPN  Outcome: Progressing  2025 by Joceline Chapa LPN  Outcome: Progressing  Goal: Skin Health and Integrity  2025 by Joceline Chapa LPN  Outcome: Progressing  2025 by Joceline Chapa LPN  Outcome: Progressing  Goal: Optimal Wound Healing  2025 by Joceline Chapa LPN  Outcome: Progressing  2025 by Joceline Chapa LPN  Outcome: Progressing     Problem: Postpartum ( Delivery)  Goal: Successful Parent Role Transition  2025 by Joceline Chapa LPN  Outcome: Progressing  2025 by Joceline Chapa LPN  Outcome: Progressing  Goal: Hemostasis  2025 by Joceline Chapa LPN  Outcome: Progressing  2025 by Joceline Chapa LPN  Outcome: Progressing  Goal: Effective Bowel Elimination  2025 by Joceline Chapa LPN  Outcome: Progressing  2025 by Joceline Chapa LPN  Outcome: Progressing  Goal: Fluid and Electrolyte Balance  2025 by Joceline Chapa LPN  Outcome: Progressing  2025 by Joceline Chapa LPN  Outcome: Progressing  Goal: Absence of Infection Signs and Symptoms  2025 by Joceline Chapa LPN  Outcome: Progressing  2025 by Joceline Chapa LPN  Outcome: Progressing  Goal: Anesthesia/Sedation Recovery  2025 by Joceline Chapa LPN  Outcome: Progressing  2025 by Joceline Chapa LPN  Outcome: Progressing  Goal: Optimal Pain Control and Function  2025 by Joceline Chapa LPN  Outcome: Progressing  2025 by Joceline Chapa LPN  Outcome: Progressing  Goal: Nausea and Vomiting Relief  2025 by Joceline Chapa LPN  Outcome: Progressing  2025 by Joceline Chapa LPN  Outcome: Progressing  Goal: Effective Urinary Elimination  2025 by Joceline Chapa LPN  Outcome: Progressing  2025 by Joceline Chapa LPN  Outcome: Progressing  Goal:  Effective Oxygenation and Ventilation  5/9/2025 0243 by Joceline Chapa LPN  Outcome: Progressing  5/9/2025 0243 by Joceline Chapa LPN  Outcome: Progressing     Problem: Breastfeeding  Goal: Effective Breastfeeding  5/9/2025 0243 by Joceline Chapa LPN  Outcome: Progressing  5/9/2025 0243 by Joceline Chapa LPN  Outcome: Progressing

## 2025-05-10 PROCEDURE — 11000001 HC ACUTE MED/SURG PRIVATE ROOM

## 2025-05-10 PROCEDURE — 25000003 PHARM REV CODE 250: Performed by: STUDENT IN AN ORGANIZED HEALTH CARE EDUCATION/TRAINING PROGRAM

## 2025-05-10 PROCEDURE — 25000003 PHARM REV CODE 250: Performed by: OBSTETRICS & GYNECOLOGY

## 2025-05-10 RX ORDER — OXYCODONE AND ACETAMINOPHEN 5; 325 MG/1; MG/1
1 TABLET ORAL EVERY 4 HOURS PRN
Qty: 15 TABLET | Refills: 0 | Status: SHIPPED | OUTPATIENT
Start: 2025-05-10

## 2025-05-10 RX ADMIN — SIMETHICONE 80 MG: 80 TABLET, CHEWABLE ORAL at 10:05

## 2025-05-10 RX ADMIN — SIMETHICONE 80 MG: 80 TABLET, CHEWABLE ORAL at 05:05

## 2025-05-10 RX ADMIN — DOCUSATE SODIUM 200 MG: 100 CAPSULE, LIQUID FILLED ORAL at 07:05

## 2025-05-10 RX ADMIN — IBUPROFEN 800 MG: 800 TABLET ORAL at 05:05

## 2025-05-10 RX ADMIN — IBUPROFEN 800 MG: 800 TABLET ORAL at 09:05

## 2025-05-10 RX ADMIN — PRENATAL VITAMINS-IRON FUMARATE 27 MG IRON-FOLIC ACID 0.8 MG TABLET 1 TABLET: at 07:05

## 2025-05-10 RX ADMIN — IBUPROFEN 800 MG: 800 TABLET ORAL at 01:05

## 2025-05-10 NOTE — PROGRESS NOTES
PostPartum Progress Note        Subjective:      Postpartum Day #2 after LTCS.    Patient is without complaints. Lochia decreasing, less than menses.  Pain is well controlled. Patient is ambulating. Tolerating regular diet.Overall mother and baby are doing well.     Objective:      Temp:  [97.6 °F (36.4 °C)-98.5 °F (36.9 °C)] 98.3 °F (36.8 °C)  Pulse:  [79-86] 86  Resp:  [16-18] 16  SpO2:  [95 %-98 %] 98 %  BP: (116-138)/(68-82) 120/79  No intake or output data in the 24 hours ending 05/10/25 0758  Body mass index is 28.35 kg/m².    General: no acute distress  Abdomen: soft, non-tender, non-distended; Fundus firm and below the umbilicus  LTCS incision healing well   Extremities: non-tender, symmetric, mild edema    Group & Rh   Date Value Ref Range Status   2025 O POS  Final     Recent Results (from the past 2 weeks)   CBC with Differential    Collection Time: 25  5:28 AM   Result Value Ref Range    WBC 18.32 (H) 4.50 - 11.50 x10(3)/mcL    Hgb 10.4 (L) 12.0 - 16.0 g/dL    Hct 31.1 (L) 37.0 - 47.0 %    Platelet 233 130 - 400 x10(3)/mcL   CBC with Differential    Collection Time: 25 11:40 AM   Result Value Ref Range    WBC 11.09 4.50 - 11.50 x10(3)/mcL    Hgb 11.3 (L) 12.0 - 16.0 g/dL    Hct 34.3 (L) 37.0 - 47.0 %    Platelet 257 130 - 400 x10(3)/mcL          Assessment:     34 y.o.  S/P , Post-Partum (post-op) Day # 2 - Doing Well      Plan:     1. Continue routine postpartum care  2. Anticipated d/c in 1-2 day(s)    Aj Villareal Jr MD

## 2025-05-11 VITALS
OXYGEN SATURATION: 97 % | SYSTOLIC BLOOD PRESSURE: 125 MMHG | BODY MASS INDEX: 28.52 KG/M2 | DIASTOLIC BLOOD PRESSURE: 81 MMHG | HEIGHT: 62 IN | TEMPERATURE: 99 F | RESPIRATION RATE: 20 BRPM | WEIGHT: 155 LBS | HEART RATE: 86 BPM

## 2025-05-11 PROCEDURE — 90471 IMMUNIZATION ADMIN: CPT | Performed by: OBSTETRICS & GYNECOLOGY

## 2025-05-11 PROCEDURE — 90710 MMRV VACCINE SC: CPT | Performed by: OBSTETRICS & GYNECOLOGY

## 2025-05-11 PROCEDURE — 25000003 PHARM REV CODE 250: Performed by: OBSTETRICS & GYNECOLOGY

## 2025-05-11 PROCEDURE — 25000003 PHARM REV CODE 250: Performed by: STUDENT IN AN ORGANIZED HEALTH CARE EDUCATION/TRAINING PROGRAM

## 2025-05-11 PROCEDURE — 63600175 PHARM REV CODE 636 W HCPCS: Performed by: OBSTETRICS & GYNECOLOGY

## 2025-05-11 RX ADMIN — IBUPROFEN 800 MG: 800 TABLET ORAL at 10:05

## 2025-05-11 RX ADMIN — OXYCODONE AND ACETAMINOPHEN 1 TABLET: 325; 10 TABLET ORAL at 12:05

## 2025-05-11 RX ADMIN — IBUPROFEN 800 MG: 800 TABLET ORAL at 03:05

## 2025-05-11 RX ADMIN — MEASLES, MUMPS, AND RUBELLA VIRUS VACCINE LIVE 0.5 ML: 1000; 12500; 1000 INJECTION, POWDER, LYOPHILIZED, FOR SUSPENSION SUBCUTANEOUS at 10:05

## 2025-05-11 RX ADMIN — DOCUSATE SODIUM 200 MG: 100 CAPSULE, LIQUID FILLED ORAL at 10:05

## 2025-05-11 NOTE — DISCHARGE SUMMARY
"Delivery Discharge Summary  Obstetrics      Primary OB Clinician: Aj Villareal MD    Discharge Provider: Aj Villareal Jr, MD    Admission date: 2025  Discharge date: 2025    Admit Dx:  Problem List[1]     Discharge Dx:    Same    Procedure:  delivery    Hospital Course:  Renee Aponte is a 34 y.o. now  who was admitted on 2025 for delivery. Patient delivered a viable . Please see delivery note for further details. Pt was in stable condition post delivery and was transferred to the Mother-Baby Unit. Her postpartum course was uncomplicated. On the date of discharge, patient's pain is controlled with oral pain medications. She is tolerating ambulation without SOB or CP, and PO diet without N/V. Reported lochia is within the normal range. Pt in stable condition and ready for discharge.     Pertinent studies:  Postpartum CBC  Lab Results   Component Value Date    WBC 18.32 (H) 2025    HGB 10.4 (L) 2025    HCT 31.1 (L) 2025    MCV 87.4 2025     2025       Delivery:    Episiotomy:     Lacerations:     Repair suture:     Repair # of packets:     Blood loss (ml):       Birth information:  YOB: 2024   Time of birth: 4:54 PM   Sex: male   Delivery type: , Low Transverse   Gestational Age: 37w0d     Measurements    Weight: 2693 g  Weight (lbs): 5 lb 15 oz  Length: 48.3 cm  Length (in): 19"  Head circumference: 34.3 cm         Delivery Clinician: Delivery Providers    Delivering clinician: Aj Villareal MD   Provider Role    Basil Villareal MD Assisting Surgeon    Yoselyn, Sara, RN Delivery Nurse    Mariela Esparza ST Scrub Person    Lucian Hawkins RN Registered Nurse    Myah Hirsch, RRT Respiratory Therapist             Additional  information:  Forceps:    Vacuum:    Breech:    Observed anomalies      Living?:     Apgars    Living status: Living  Apgar Component Scores:  1 min.:  5 min.:  10 " min.:  15 min.:  20 min.:    Skin color:  0  1       Heart rate:  2  2       Reflex irritability:  2  2       Muscle tone:  2  2       Respiratory effort:  2  2       Total:  8  9       Apgars assigned by: LEON TUBBS RN         Placenta: Delivered:       appearance    Disposition: To home, self care    Follow Up: 1-6 weeks    Patient Instructions:   1. Call the office for any bleeding >2 pads/hour for >2 hours, temperature >100.4, pain that is uncontrolled with medications, or for any other concerns.  2. Pelvic rest and no tub baths x 6 weeks.    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

## 2025-05-23 ENCOUNTER — ANESTHESIA (OUTPATIENT)
Dept: OBSTETRICS AND GYNECOLOGY | Facility: HOSPITAL | Age: 35
End: 2025-05-23
Payer: COMMERCIAL

## (undated) DEVICE — SOL WATER STRL IRR 1000ML

## (undated) DEVICE — BULB SYRINGE EAR IRRIGATION

## (undated) DEVICE — SUT 2/0 27IN PLAIN GUT CT

## (undated) DEVICE — TRAY CATH 1-LYR URIMTR 16FR

## (undated) DEVICE — MATTRESS HOVERMAT TRNSF 39X78

## (undated) DEVICE — BINDER ABDOM 4PANEL 12IN LG/XL

## (undated) DEVICE — STAPLER SKIN PROXIMATE REG

## (undated) DEVICE — PAD SANITARY OB STERILE

## (undated) DEVICE — PAD UNDERPAD 30X30

## (undated) DEVICE — Device

## (undated) DEVICE — SUT 1 36IN CHROMIC GUT CTX

## (undated) DEVICE — SUT CHROMIC GUT 2-0 CT-1 27IN

## (undated) DEVICE — ELECTRODE REM PLYHSV RETURN 9

## (undated) DEVICE — SUT VICRYL + 2-0 27IN XLH

## (undated) DEVICE — SET FLUID WARMER RANGER

## (undated) DEVICE — CAP BABY BEANIE

## (undated) DEVICE — DRESSING WND GZ 10X4X8X2IN

## (undated) DEVICE — SEE MEDLINE ITEM 157117

## (undated) DEVICE — SOL NACL IRR 1000ML BTL

## (undated) DEVICE — SEE MEDLINE ITEM 156931

## (undated) DEVICE — SUT 1 27IN CHROMIC GUT CT-1